# Patient Record
Sex: FEMALE | Race: WHITE | NOT HISPANIC OR LATINO | URBAN - METROPOLITAN AREA
[De-identification: names, ages, dates, MRNs, and addresses within clinical notes are randomized per-mention and may not be internally consistent; named-entity substitution may affect disease eponyms.]

---

## 2017-04-21 ASSESSMENT — ENCOUNTER SYMPTOMS
SMELL DISTURBANCE: 0
SLEEP DISTURBANCES DUE TO BREATHING: 0
DECREASED LIBIDO: 0
EXERCISE INTOLERANCE: 0
SINUS PAIN: 1
HYPOTENSION: 0
HYPERTENSION: 0
ORTHOPNEA: 0
TASTE DISTURBANCE: 0
SINUS CONGESTION: 1
CLAUDICATION: 0
LIGHT-HEADEDNESS: 0
TACHYCARDIA: 0
LEG SWELLING: 0
TROUBLE SWALLOWING: 0
LEG PAIN: 0
HOARSE VOICE: 0
PALPITATIONS: 0
NECK MASS: 0
HOT FLASHES: 0
SORE THROAT: 1
SYNCOPE: 0

## 2017-04-21 ASSESSMENT — ACTIVITIES OF DAILY LIVING (ADL)
DO_MEMBERS_OF_YOUR_HOUSEHOLD_USE_SAFETY_HELMETS?: Y
ARE_THERE_FIREARMS_IN_YOUR_HOME?: N
DO_MEMBERS_OF_YOUR_HOUSEHOLD_USE_SUNSCREEN?: Y
ARE_THERE_SMOKE_DETECTORS_IN_YOUR_HOME?: Y
ARE_THERE_CARBON_MONOXIDE_DETECTORS_IN_YOUR_HOME?: Y
DO_MEMBERS_OF_YOUR_HOUSEHOLD_WEAR_SEAT_BELTS?: Y

## 2017-04-24 ENCOUNTER — OFFICE VISIT (OUTPATIENT)
Dept: INTERNAL MEDICINE | Facility: CLINIC | Age: 42
End: 2017-04-24

## 2017-04-24 VITALS
TEMPERATURE: 97.8 F | WEIGHT: 111.5 LBS | HEIGHT: 62 IN | OXYGEN SATURATION: 100 % | SYSTOLIC BLOOD PRESSURE: 120 MMHG | HEART RATE: 71 BPM | BODY MASS INDEX: 20.52 KG/M2 | RESPIRATION RATE: 16 BRPM | DIASTOLIC BLOOD PRESSURE: 82 MMHG

## 2017-04-24 DIAGNOSIS — H61.20 WAX IN EAR: ICD-10-CM

## 2017-04-24 DIAGNOSIS — H69.91 DYSFUNCTION OF EUSTACHIAN TUBE, RIGHT: ICD-10-CM

## 2017-04-24 DIAGNOSIS — R53.82 CHRONIC FATIGUE: ICD-10-CM

## 2017-04-24 DIAGNOSIS — J30.2 SEASONAL ALLERGIC RHINITIS, UNSPECIFIED ALLERGIC RHINITIS TRIGGER: ICD-10-CM

## 2017-04-24 DIAGNOSIS — Z00.00 ROUTINE GENERAL MEDICAL EXAMINATION AT A HEALTH CARE FACILITY: ICD-10-CM

## 2017-04-24 DIAGNOSIS — Z12.31 VISIT FOR SCREENING MAMMOGRAM: ICD-10-CM

## 2017-04-24 DIAGNOSIS — Z85.828 HISTORY OF SKIN CANCER: Primary | ICD-10-CM

## 2017-04-24 LAB
CHOLEST SERPL-MCNC: 190 MG/DL
DEPRECATED CALCIDIOL+CALCIFEROL SERPL-MC: 33 UG/L (ref 20–75)
ERYTHROCYTE [DISTWIDTH] IN BLOOD BY AUTOMATED COUNT: 13.5 % (ref 10–15)
FERRITIN SERPL-MCNC: 42 NG/ML (ref 12–150)
HCT VFR BLD AUTO: 36.7 % (ref 35–47)
HDLC SERPL-MCNC: 86 MG/DL
HGB BLD-MCNC: 11.9 G/DL (ref 11.7–15.7)
LDLC SERPL CALC-MCNC: 78 MG/DL
MCH RBC QN AUTO: 30.2 PG (ref 26.5–33)
MCHC RBC AUTO-ENTMCNC: 32.4 G/DL (ref 31.5–36.5)
MCV RBC AUTO: 93 FL (ref 78–100)
NONHDLC SERPL-MCNC: 104 MG/DL
PLATELET # BLD AUTO: 260 10E9/L (ref 150–450)
RBC # BLD AUTO: 3.94 10E12/L (ref 3.8–5.2)
TRIGL SERPL-MCNC: 130 MG/DL
TSH SERPL DL<=0.05 MIU/L-ACNC: 1.11 MU/L (ref 0.4–4)
WBC # BLD AUTO: 6.3 10E9/L (ref 4–11)

## 2017-04-24 RX ORDER — LORATADINE 10 MG/1
10 TABLET ORAL DAILY
COMMUNITY
Start: 2015-07-22 | End: 2021-05-11

## 2017-04-24 ASSESSMENT — PAIN SCALES - GENERAL: PAINLEVEL: NO PAIN (0)

## 2017-04-24 NOTE — MR AVS SNAPSHOT
After Visit Summary   4/24/2017    Marli Cherry    MRN: [id number]           Patient Information     Date Of Birth          1975        Visit Information        Provider Department      4/24/2017 7:30 AM Adelaide Correa MD [facility]        Today's Diagnoses     History of skin cancer    -  1    Routine general medical examination at a health care facility        Visit for screening mammogram        Chronic fatigue        Dysfunction of eustachian tube, right        Seasonal allergic rhinitis, unspecified allergic rhinitis trigger        Wax in ear          Care Instructions    Primary Care Center Medication Refill Request Information:  * Please contact your pharmacy regarding ANY request for medication refills.  ** [facility] Prescription Fax = [phone]  * Please allow 3 business days for routine medication refills.  * Please allow 5 business days for controlled substance medication refills.     Primary Care Center Test Result notification information:  *You will be notified with in 7-10 days of your appointment day regarding the results of your test.  If you are on MyChart you will be notified as soon as the provider has reviewed the results and signed off on them.    Primary Care Center Phone Number [phone]    Dermatology [phone] (3rd Floor [facility])     Breast Center ([facility]) [phone] (2nd Floor [facility])   Breast Center ([facility]) [phone] ([address])     Mammogram Screening Tool    Mammogram   Does patient have a history of breast cancer? no  Does patient have breast implants? no  Reason for mammogram? Rountine        Try nasal spray, flonase or nasonex for ear plugging.  Can try debrox drops for ear wax, or carbamide peroxide.        Follow-ups after your visit        Additional Services     DERMATOLOGY REFERRAL       Your provider has referred you to: [facility]: Dermatology Clinic [facility] [phone]    [email or web address]    Please be aware that coverage of these services is subject to the terms and limitations of your health insurance plan.  Call member services at your health plan with any benefit or coverage questions.      Please bring the following with you to your appointment:    (1) Any X-Rays, CTs or MRIs which have been performed.  Contact the facility where they were done to arrange for  prior to your scheduled appointment.    (2) List of current medications  (3) This referral request   (4) Any documents/labs given to you for this referral                  Follow-up notes from your care team     Return in about 1 year (around 4/24/2018) for Physical Exam.      Your next 10 appointments already scheduled     Apr 24, 2017  8:45 AM CDT   LAB with  LAB   [facility] ([facility])    [address] [place] [address] [phone]           Patient must bring picture ID.  Patient should be prepared to give a urine specimen  Please do not eat 10-12 hours before your appointment if you are coming in fasting for labs on lipids, cholesterol, or glucose (sugar).  Pregnant women should follow their Care Team instructions. Water with medications is okay. Do not drink coffee or other fluids.   If you have concerns about taking  your medications, please ask at office or if scheduling via [employer], send a message by clicking on Secure Messaging, Message Your Care Team.            May 04, 2017  7:45 AM CDT   (Arrive by 7:30 AM)   MA SCREENING DIGITAL BILATERAL with [id number]   [facility] ([facility])    [address] [place] [address] [phone]           Do not use any powder, lotion or deodorant under your arms or on your breast. If you do, we will ask you to remove it before your exam.  Wear comfortable, two-piece clothing.  If you have any allergies,  tell your care team.  Bring any previous mammograms from other facilities or have them mailed to the breast center. This mammogram location, [facility], now offers 3D mammography. It doesn't replace a screening mammogram and can be done with a regular screening mammogram. It is optional and not all insurances will pay for it. 3D mammography is a special kind of mammogram that produces a three-dimensional image of the breast by using low dose-xrays. 3D allows the radiologist to see the breast tissue differently from 2D, which reduces the chance of repeat testing due to overlapping breast tissue. If you are interested in have a 3D mammogram, please check with your insurance before you arrive for your exam. 3D mammography is offered to all patients. On the day of your exam you will be asked to sign a form stating yes, you wish to have 3D imaging or, no, you decline.              Future tests that were ordered for you today     Open Future Orders        Priority Expected Expires Ordered    Mammogram, routine screening Routine  4/24/2018 4/24/2017    Lipid panel reflex to direct LDL Routine 4/24/2017 5/8/2017 4/24/2017    CBC with platelets Routine 4/24/2017 5/8/2017 4/24/2017    Ferritin Routine 4/24/2017 4/24/2018 4/24/2017    Vitamin D Deficiency Routine 4/24/2017 4/24/2018 4/24/2017    TSH Routine 4/24/2017 4/24/2018 4/24/2017            Who to contact     Please call your clinic at [phone] to:    Ask questions about your health    Make or cancel appointments    Discuss your medicines    Learn about your test results    Speak to your doctor   If you have compliments or concerns about an experience at your clinic, or if you wish to file a complaint, please contact [facility] Patient Relations at [phone] or email us at [email or web address]         Additional Information About Your Visit        [email or web address]     [employer] gives you secure  "access to your electronic health record. If you see a primary care provider, you can also send messages to your care team and make appointments. If you have questions, please call your primary care clinic.  If you do not have a primary care provider, please call [phone] and they will assist you.      [employer] is an electronic gateway that provides easy, online access to your medical records. With [employer], you can request a clinic appointment, read your test results, renew a prescription or communicate with your care team.     To access your existing account, please contact your [facility] or call [phone] for assistance.        Care EveryWhere ID     This is your Care EveryWhere ID. This could be used by other organizations to access your [place] medical records  [id number]        Your Vitals Were     Pulse Temperature Respirations Height Last Period Pulse Oximetry    71 97.8  F (36.6  C) (Oral) 16 1.575 m (5' 2.01\") 04/10/2017 (Exact Date) 100%    Breastfeeding? BMI (Body Mass Index)                No 20.39 kg/m2           Blood Pressure from Last 3 Encounters:   04/24/17 120/82    Weight from Last 3 Encounters:   04/24/17 50.6 kg (111 lb 8 oz)              We Performed the Following     DERMATOLOGY REFERRAL        Primary Care Provider Office Phone # Fax #    Samira Cuellar [phone]       [facility] [address] [facility] [address]        Thank you!     Thank you for choosing [facility]  for your care. Our goal is always to provide you with excellent care. Hearing back from our patients is one way we can continue to improve our services. Please take a few minutes to complete the written survey that you may receive in the mail after your visit with us. Thank you!             Your Updated Medication List - Protect others around you: Learn how to safely use, store and throw away your medicines at www.disposemymeds.org.    "       This list is accurate as of: 4/24/17  8:26 AM.  Always use your most recent med list.                   Brand Name Dispense Instructions for use    loratadine 10 MG tablet    CLARITIN     Take 10 mg by mouth

## 2017-04-24 NOTE — PROGRESS NOTES
Ms. Cherry is a 42 year old female here to establish care/routine physical.    History of Present Illness:  Marli is a generally healthy female here for annual physical.  No major concerns today, just a few minor concerns:    A few years ago spots removed by dermatologist.  Feels like they are coming back.  Wonders about seeing derm again.  Removed in [facility], possibly basal cell.  No records available.    Allergies, she does have R sided ear issues, associated with seasonal allergies.  Takes claritin which helps, occasionally pains.  Feels full, like water is in ear.      Feels like energy is low.  Sleep is poor, wakes up early.  Stress is manageable.  No fevers, weight loss, no night sweats.      Goes to gym 3 days per week  Walks to work  [place]   No children  Does administrative work      3 rounds IVF--unsuccessful  Last pap 9/15  No abnormal paps  Abnormal menses every 24-31 days  No abnormal mammos    A full 10-pt Review of Systems was performed, verified and is negative except as documented in the HPI.  All health questionnaires were reviewed, verified and relevant information documented above.    Past Medical History:  Past Medical History:   Diagnosis Date     Infertility, female      Skin cancer of chest, excluding breast        Past Surgical History:  Past Surgical History:   Procedure Laterality Date     LAPAROSCOPY         Active Meds:  Current Outpatient Prescriptions   Medication     loratadine (CLARITIN) 10 MG tablet     No current facility-administered medications for this visit.         Allergies:  Seasonal allergies    Family History:  family history includes Breast Cancer in her maternal grandmother; HEART DISEASE in her maternal grandmother; Hyperlipidemia in her father; Hypertension in her father.    Social History:  Social History   Substance Use Topics     Smoking status: Never Smoker     Smokeless tobacco: Not on file     Alcohol use 8.4 oz/week     14 Glasses  "of wine per week       Physical Exam:  Vitals: /82  Pulse 71  Temp 97.8  F (36.6  C) (Oral)  Resp 16  Ht 1.575 m (5' 2.01\")  Wt 50.6 kg (111 lb 8 oz)  LMP 04/10/2017 (Exact Date)  SpO2 100%  Breastfeeding? No  BMI 20.39 kg/m2  Constitutional: Alert, oriented, pleasant, thin, no acute distress  Head: Normocephalic, atraumatic  Eyes: Extra-ocular movements intact, pupils equally round and reactive bilaterally, no scleral icterus  ENT: Oropharynx clear, moist mucus membranes, good dentition  Neck: Supple, no lymphadenopathy, questionable thyromegaly vs soft tissue , no nodules palpate, no JVD  Cardiovascular: Regular rate and rhythm, no murmurs, rubs or gallops, peripheral pulses full/symmetric  Respiratory: Good air movement bilaterally, lungs clear, no wheezes/rales/rhonchi  Breast: Breasts: normal without suspicious masses, skin changes or axillary nodes  GI: Abdomen soft, bowel sounds present, nondistended, nontender, no organomegaly or masses, no rebound/guarding  Musculoskeletal: No edema, normal muscle tone, normal gait  Neurologic: Alert and oriented, cranial nerves 2-12 intact, nonfocal  Skin: multiple benign appearing nevi, slight erythema surrounding scar in area of previously incised nevus on L clavicular region, no drainage, not raised  Psychiatric: normal mentation, affect and mood    Recent Labs:  None on file    Assessment and Plan:  Marli was seen today for establish care and physical.    Diagnoses and all orders for this visit:    History of skin cancer: Numerous nevi today.  Full check not done but nothing alarming seen.  Some erythema on chest surrounding area of previously excised mole.  Needs further eval.  -     DERMATOLOGY REFERRAL    Routine general medical examination at a health care facility  -     Lipid panel reflex to direct LDL; Future    Visit for screening mammogram  -     Mammogram, routine screening; Future    Chronic fatigue: Possibly related to perimenopausal " changes.  -     CBC with platelets; Future  -     Ferritin; Future  -     Vitamin D Deficiency; Future  -     TSH; Future    Dysfunction of eustachian tube, right        - Recommended flonase    Seasonal allergic rhinitis, unspecified allergic rhinitis trigger        - Can try antihistamines    Wax in ear        - Recommended debrox drops      #Routine Health Maintenence:  Immunizations (zoster, pneumovax, flu, Tdap, Hep A/B):   Most Recent Immunizations   Administered Date(s) Administered     Influenza (IIV3) 09/14/2016     TDAP Vaccine (Boostrix) 11/23/2011     Lipids: ordered  Lung Ca Screening (>30 pk age 55-79 or >20 py age 50-79 + RF): n/a  Colonoscopy (50-75 yrs): n/a  Dexa (>65W or 70M yrs): n/a  Mammogram (40-75 yrs): ordered  Pap (21-65 yrs):  9/15 normal per patient  Pelvic/Breast: breast today  GC/Chlam (<25 yrs): n/a  HIV/HCV if risk factors: deferred  Safety/Lifestyle: reviewed  Tob/EtOH: screened  Depression: screen neg  Advanced Directive: deferred    Return to clinic:  1 year or prn    Adelaide Correa MD  Internal Medicine          Answers for HPI/ROS submitted by the patient on 4/21/2017   Annual Exam:  General Symptoms: No  Skin Symptoms: No  HENT Symptoms: Yes  EYE SYMPTOMS: No  HEART SYMPTOMS: Yes  LUNG SYMPTOMS: No  INTESTINAL SYMPTOMS: No  URINARY SYMPTOMS: No  GYNECOLOGIC SYMPTOMS: Yes  BREAST SYMPTOMS: No  SKELETAL SYMPTOMS: No  BLOOD SYMPTOMS: No  NERVOUS SYSTEM SYMPTOMS: No  MENTAL HEALTH SYMPTOMS: No  Ear pain: Yes  Ear discharge: No  Hearing loss: No  Tinnitus: No  Nosebleeds: Yes  Congestion: Yes  Sinus pain: Yes  Trouble swallowing: No   Voice hoarseness: No  Mouth sores: No  Sore throat: Yes  Tooth pain: No  Gum tenderness: No  Bleeding gums: No  Change in taste: No  Change in sense of smell: No  Dry mouth: No  Hearing aid used: No  Neck lump: No  Chest pain or pressure: No  Fast or irregular heartbeat: No  Pain in legs with walking: No  Swelling in feet or ankles: No  Trouble breathing  while lying down: No  Fingers or Toes appear blue: No  High blood pressure: No  Low blood pressure: No  Fainting: No  Murmurs: No  Chest pain on exertion: No  Chest pain at rest: No  Cramping pain in leg during exercise: No  Pacemaker: No  Varicose veins: Yes  Edema or swelling: No  Fast heart beat: No  Wake up at night with shortness of breath: No  Heart flutters: Yes  Light-headedness: No  Exercise intolerance: No  Bleeding or spotting between periods: Yes  Heavy or painful periods: Yes  Irregular periods: Yes  Vaginal discharge: No  Hot flashes: No  Vaginal dryness: No  Genital ulcers: No  Reduced libido: No  Painful intercourse: No  Difficulty with sexual arousal: No  Post-menopausal bleeding: No  Frequency of exercise:: 2-3 days/week  Duration of exercise:: 45-60 minutes

## 2017-04-24 NOTE — PATIENT INSTRUCTIONS
Primary Care Center Medication Refill Request Information:  * Please contact your pharmacy regarding ANY request for medication refills.  ** [facility] Prescription Fax = [phone]  * Please allow 3 business days for routine medication refills.  * Please allow 5 business days for controlled substance medication refills.     Primary Care Center Test Result notification information:  *You will be notified with in 7-10 days of your appointment day regarding the results of your test.  If you are on MyChart you will be notified as soon as the provider has reviewed the results and signed off on them.    Primary Care Center Phone Number [phone]    Dermatology [phone] (3rd Floor [facility])     Breast Center ([facility]) [phone] (2nd Floor [facility])   Breast Center ([facility]) [phone] ([address])     Mammogram Screening Tool    Mammogram   Does patient have a history of breast cancer? no  Does patient have breast implants? no  Reason for mammogram? Rountine        Try nasal spray, flonase or nasonex for ear plugging.  Can try debrox drops for ear wax, or carbamide peroxide.

## 2017-04-24 NOTE — NURSING NOTE
Chief Complaint   Patient presents with     Establish Care     Patient is here to establish a new PCP.      Physical     Patient is here for annual physical.      Candis Elizabeth LPN at 7:30 AM on 4/24/2017.

## 2017-05-30 ENCOUNTER — RADIANT APPOINTMENT (OUTPATIENT)
Dept: MAMMOGRAPHY | Facility: CLINIC | Age: 42
End: 2017-05-30

## 2017-05-30 ENCOUNTER — OFFICE VISIT (OUTPATIENT)
Dept: DERMATOLOGY | Facility: CLINIC | Age: 42
End: 2017-05-30

## 2017-05-30 DIAGNOSIS — Z12.31 VISIT FOR SCREENING MAMMOGRAM: ICD-10-CM

## 2017-05-30 DIAGNOSIS — D22.9 MULTIPLE BENIGN MELANOCYTIC NEVI: Primary | ICD-10-CM

## 2017-05-30 DIAGNOSIS — Z85.828 HISTORY OF NONMELANOMA SKIN CANCER: ICD-10-CM

## 2017-05-30 DIAGNOSIS — L82.1 SK (SEBORRHEIC KERATOSIS): ICD-10-CM

## 2017-05-30 ASSESSMENT — PAIN SCALES - GENERAL: PAINLEVEL: NO PAIN (0)

## 2017-05-30 NOTE — PROGRESS NOTES
[facility] Dermatology Note      Dermatology Problem List:  1. BCC, L upper chest, shave removal in Spring 2016  2. Multiple melanocytic nevi    Encounter Date: May 30, 2017    CC:   Chief Complaint   Patient presents with     Derm Problem     Marli is here today for a skin check.  States she has a spot on her chest she's concerned about where she had a bcc removed a year ago.       History of Present Illness:  Ms. Marli Cherry is a 42 year old female who presents to [facility] care. She has a history of a BCC of her left upper chest, removed with two shave procedures by Dr. Pena ([facility]), and presents reports concerns about pinkness and firmness at the site. She reports having numerous moles, but none new or changing. Denies painful, bleeding, nonhealing lesions on the skin.    Past Medical History:   There is no problem list on file for this patient.    Past Medical History:   Diagnosis Date     Infertility, female      Skin cancer of chest, excluding breast      Past Surgical History:   Procedure Laterality Date     BIOPSY OF SKIN LESION       LAPAROSCOPY       Social History:  The patient works in [employer]. She wears SPF 15 daily, and SPF30 when outdoors for extended time periods.    Family History:  Negative for melanoma, maternal aunt with breast ca, no pancreatic cancer.    Medications:  Current Outpatient Prescriptions   Medication Sig Dispense Refill     loratadine (CLARITIN) 10 MG tablet Take 10 mg by mouth          Allergies   Allergen Reactions     Seasonal Allergies      Review of Systems:  General: No recent infections, fevers, chills  Skin: No new/changing moles  Lymphatic: No subcutaneous lumps    Physical exam:  Vitals: There were no vitals taken for this visit.  GEN: Well-appearing female, no discomfort or distress, cooperative with the exam  SKIN: Total skin excluding the undergarment areas was performed. The exam included the head/face, neck, both  arms, chest, back, abdomen, both legs, digits and/or nails:   - lesion of concern, L upper chest: well-healed round pink-red scar, no friability or recurrence  - lesion of concern, L posterior thigh: waxy, light-brown 5 mm papule, no melanocytic features  - numerous well-marginated brown-to-black macules and occasional flesh-toned papules, most with even, reticular dermoscopic features  -No other lesions of concern on areas examined.     Impression/Plan:  1. History of BCC, L upper chest, well healed with mildly hypertrophic scarring and no evidence of recurrence.  2. Multiple melanocytic nevi, none with concerning features.  3. Seborrheic keratosis, L posterior thigh  - Discussed etiology, natural history, and reassured of the benign nature of these lesions.  - No further treatment required.  - Counseled on photoprotection, reviewed the ABCDE's of skin cancer monitoring.   - advised light massage daily to the L upper chest scar     Follow-up: 1 year.    Discussed and examined with [facility] staff dermatologist Dr. Marcos Magallanes.    Aleksander Cheng MD, EDEN  PGY-4, Dermatology    Staff Involved:  Resident(Aleksander Cheng)/Staff(as above)    Staff Physician Comments:   I saw and evaluated the patient with the resident and I agree with the assessment and plan.  I was present for the examination.    Marcos Magallanes MD  Dermatology Staff Physician  , Department of Dermatology

## 2017-05-30 NOTE — MR AVS SNAPSHOT
After Visit Summary   5/30/2017    Marli Cherry    MRN: [id number]           Patient Information     Date Of Birth          1975        Visit Information        Provider Department      5/30/2017 3:15 PM Marcos Magallanes MD [facility] Dermatology        Today's Diagnoses     Multiple benign melanocytic nevi    -  1    History of nonmelanoma skin cancer        SK (seborrheic keratosis)           Follow-ups after your visit        Who to contact     Please call your clinic at [phone] to:    Ask questions about your health    Make or cancel appointments    Discuss your medicines    Learn about your test results    Speak to your doctor   If you have compliments or concerns about an experience at your clinic, or if you wish to file a complaint, please contact [facility] Patient Relations at [phone] or email us at [email or web address]         Additional Information About Your Visit        MyChart Information     [employer] gives you secure access to your electronic health record. If you see a primary care provider, you can also send messages to your care team and make appointments. If you have questions, please call your primary care clinic.  If you do not have a primary care provider, please call [phone] and they will assist you.      [employer] is an electronic gateway that provides easy, online access to your medical records. With [employer], you can request a clinic appointment, read your test results, renew a prescription or communicate with your care team.     To access your existing account, please contact your [facility] or call [phone] for assistance.        Care EveryWhere ID     This is your Care EveryWhere ID. This could be used by other organizations to access your [place] medical records  [id number]         Blood Pressure from Last 3 Encounters:   04/24/17 120/82    Weight from Last 3 Encounters:    04/24/17 50.6 kg (111 lb 8 oz)              Today, you had the following     No orders found for display       Primary Care Provider Office Phone # Fax #    Samira Cuellar [phone]       [facility] [address] [facility] [address]        Thank you!     Thank you for choosing [facility]  for your care. Our goal is always to provide you with excellent care. Hearing back from our patients is one way we can continue to improve our services. Please take a few minutes to complete the written survey that you may receive in the mail after your visit with us. Thank you!             Your Updated Medication List - Protect others around you: Learn how to safely use, store and throw away your medicines at www.disposemymeds.org.          This list is accurate as of: 5/30/17 11:59 PM.  Always use your most recent med list.                   Brand Name Dispense Instructions for use    loratadine 10 MG tablet    CLARITIN     Take 10 mg by mouth

## 2017-05-30 NOTE — NURSING NOTE
Dermatology Rooming Note    Marli EULALIA Cherry's goals for this visit include:   Chief Complaint   Patient presents with     Derm Problem     Marli is here today for a skin check.  States she has a spot on her chest she's concerned about where she had a bcc removed a year ago.         Maci Camacho, BETTYN

## 2017-05-30 NOTE — LETTER
5/30/2017       RE: Marli Cherry  [address]   [facility] [address]     Dear Colleague,    Thank you for referring your patient, Marli Cherry, to the [facility] DERMATOLOGY at [facility]. Please see a copy of my visit note below.    [facility] Dermatology Note      Dermatology Problem List:  1. BCC, L upper chest, shave removal in Spring 2016  2. Multiple melanocytic nevi    Encounter Date: May 30, 2017    CC:   Chief Complaint   Patient presents with     Derm Problem     Marli is here today for a skin check.  States she has a spot on her chest she's concerned about where she had a bcc removed a year ago.       History of Present Illness:  Ms. Marli Cherry is a 42 year old female who presents to transition care. She has a history of a BCC of her left upper chest, removed with two shave procedures by Dr. Pena ([facility]), and presents reports concerns about pinkness and firmness at the site. She reports having numerous moles, but none new or changing. Denies painful, bleeding, nonhealing lesions on the skin.    Past Medical History:   There is no problem list on file for this patient.    Past Medical History:   Diagnosis Date     Infertility, female      Skin cancer of chest, excluding breast      Past Surgical History:   Procedure Laterality Date     BIOPSY OF SKIN LESION       LAPAROSCOPY       Social History:  The patient works in administration. She wears SPF 15 daily, and SPF30 when outdoors for extended time periods.    Family History:  Negative for melanoma, maternal aunt with breast ca, no pancreatic cancer.    Medications:  Current Outpatient Prescriptions   Medication Sig Dispense Refill     loratadine (CLARITIN) 10 MG tablet Take 10 mg by mouth          Allergies   Allergen Reactions     Seasonal Allergies      Review of Systems:  General: No recent infections, fevers, chills  Skin: No new/changing moles  Lymphatic: No  subcutaneous lumps    Physical exam:  Vitals: There were no vitals taken for this visit.  GEN: Well-appearing female, no discomfort or distress, cooperative with the exam  SKIN: Total skin excluding the undergarment areas was performed. The exam included the head/face, neck, both arms, chest, back, abdomen, both legs, digits and/or nails:   - lesion of concern, L upper chest: well-healed round pink-red scar, no friability or recurrence  - lesion of concern, L posterior thigh: waxy, light-brown 5 mm papule, no melanocytic features  - numerous well-marginated brown-to-black macules and occasional flesh-toned papules, most with even, reticular dermoscopic features  -No other lesions of concern on areas examined.     Impression/Plan:  1. History of BCC, L upper chest, well healed with mildly hypertrophic scarring and no evidence of recurrence.  2. Multiple melanocytic nevi, none with concerning features.  3. Seborrheic keratosis, L posterior thigh  - Discussed etiology, natural history, and reassured of the benign nature of these lesions.  - No further treatment required.  - Counseled on photoprotection, reviewed the ABCDE's of skin cancer monitoring.   - advised light massage daily to the L upper chest scar     Follow-up: 1 year.    Discussed and examined with [facility] staff dermatologist Dr. Marcos Magallanes.    Aleksander Cheng MD, EDEN  PGY-4, Dermatology    Staff Involved:  Resident(Aleksander Cheng)/Staff(as above)    Staff Physician Comments:   I saw and evaluated the patient with the resident and I agree with the assessment and plan.  I was present for the examination.    Marcos Magallanes MD  Dermatology Staff Physician  , Department of Dermatology      Again, thank you for allowing me to participate in the care of your patient.      Sincerely,    Marcos Magallanes MD

## 2018-01-07 ENCOUNTER — HEALTH MAINTENANCE LETTER (OUTPATIENT)
Age: 43
End: 2018-01-07

## 2018-04-18 ASSESSMENT — ENCOUNTER SYMPTOMS
LEG PAIN: 0
FEVER: 0
SLEEP DISTURBANCES DUE TO BREATHING: 0
FATIGUE: 1
JAUNDICE: 0
POLYDIPSIA: 1
VOMITING: 0
INCREASED ENERGY: 1
HOT FLASHES: 0
ORTHOPNEA: 0
BOWEL INCONTINENCE: 0
DECREASED LIBIDO: 0
HALLUCINATIONS: 0
RECTAL PAIN: 0
HYPOTENSION: 0
BLOATING: 1
DECREASED APPETITE: 1
SYNCOPE: 0
LIGHT-HEADEDNESS: 1
NIGHT SWEATS: 0
HYPERTENSION: 0
CHILLS: 0
POLYPHAGIA: 0
ABDOMINAL PAIN: 1
PALPITATIONS: 0
DIARRHEA: 1
NAUSEA: 0
HEARTBURN: 1
WEIGHT LOSS: 0
WEIGHT GAIN: 1
ALTERED TEMPERATURE REGULATION: 0
BLOOD IN STOOL: 0
EXERCISE INTOLERANCE: 0
CONSTIPATION: 0

## 2018-04-30 ENCOUNTER — OFFICE VISIT (OUTPATIENT)
Dept: INTERNAL MEDICINE | Facility: CLINIC | Age: 43
End: 2018-04-30
Payer: COMMERCIAL

## 2018-04-30 VITALS
DIASTOLIC BLOOD PRESSURE: 85 MMHG | BODY MASS INDEX: 20.8 KG/M2 | HEART RATE: 66 BPM | SYSTOLIC BLOOD PRESSURE: 136 MMHG | WEIGHT: 113 LBS | HEIGHT: 62 IN

## 2018-04-30 DIAGNOSIS — Z11.4 SCREENING FOR HIV WITHOUT PRESENCE OF RISK FACTORS: ICD-10-CM

## 2018-04-30 DIAGNOSIS — Z13.220 SCREENING FOR HYPERLIPIDEMIA: ICD-10-CM

## 2018-04-30 DIAGNOSIS — Z12.31 ENCOUNTER FOR SCREENING MAMMOGRAM FOR BREAST CANCER: Primary | ICD-10-CM

## 2018-04-30 DIAGNOSIS — E04.9 GOITER: ICD-10-CM

## 2018-04-30 DIAGNOSIS — Z00.00 ROUTINE GENERAL MEDICAL EXAMINATION AT A HEALTH CARE FACILITY: ICD-10-CM

## 2018-04-30 LAB
ALBUMIN SERPL-MCNC: 4 G/DL (ref 3.4–5)
ALP SERPL-CCNC: 61 U/L (ref 40–150)
ALT SERPL W P-5'-P-CCNC: 17 U/L (ref 0–50)
AST SERPL W P-5'-P-CCNC: 14 U/L (ref 0–45)
BILIRUB DIRECT SERPL-MCNC: <0.1 MG/DL (ref 0–0.2)
BILIRUB SERPL-MCNC: 0.4 MG/DL (ref 0.2–1.3)
CHOLEST SERPL-MCNC: 185 MG/DL
CREAT SERPL-MCNC: 0.73 MG/DL (ref 0.52–1.04)
GFR SERPL CREATININE-BSD FRML MDRD: 86 ML/MIN/1.7M2
HDLC SERPL-MCNC: 66 MG/DL
HGB BLD-MCNC: 12.7 G/DL (ref 11.7–15.7)
LDLC SERPL CALC-MCNC: 90 MG/DL
NONHDLC SERPL-MCNC: 118 MG/DL
PROT SERPL-MCNC: 7.4 G/DL (ref 6.8–8.8)
TRIGL SERPL-MCNC: 143 MG/DL
TSH SERPL DL<=0.005 MIU/L-ACNC: 1.4 MU/L (ref 0.4–4)

## 2018-04-30 ASSESSMENT — PAIN SCALES - GENERAL: PAINLEVEL: NO PAIN (0)

## 2018-04-30 NOTE — PROGRESS NOTES
[facility]  Primary Care Center   Adelaide Correa MD  2018      Chief Complaint:   Physical       History of Present Illness:   Marli Cherry is a 43 year old female with a history of infertility and skin cancer of the chest who presents for annual physical. Today, the patient is feeling good. The patient's only concern today was having irritable bowels. She said over the past few months she gets symptoms of constipation, diarrhea and bloating. These symptoms occur every two weeks. She notes no new life stressors. The patient does not want to start taking any medications for this issue at this time.     The patient also brought up a recent ovarian cyst that she had. She stated that she went to [facility] to see the gynecologist there after having some discomfort in that area. Since, the cyst has corrected itself and the gynecologist had no further recommendations. She does note that her recent cycle was shorter than normal, 24 days instead of 28 days. No other acute health concerns.      3 rounds IVF--unsuccessful  Last pap 9/15,  NIL ([facility])  No abnormal paps  Abnormal menses every 24-31 days  No abnormal mammos       We reviewed the following health maintenance topics:  Immunizations  Cancer Screenings (Pap, mammogram, colonoscopy, prostate, skin, lung)  Lipids, STDs, HIV/Hep C screening if applicable  AAA screening, Dexa screening if applicable  Lifestyle factors/Saftey (diet, exercise, weight, stress, seat belts, sun protection)  Risk behaviors (tobacco, alcohol, illicit substances, abuse/violence, depression)  Advanced directive planning if applicable       Review of Systems:   Pertinent items are noted in HPI, remainder of complete ROS is negative.    Answers for HPI/ROS submitted by the patient on 2018   General Symptoms: Yes  Skin Symptoms: No  HENT Symptoms: No  EYE SYMPTOMS: No  HEART SYMPTOMS: Yes  LUNG SYMPTOMS: No  INTESTINAL SYMPTOMS: Yes  URINARY  SYMPTOMS: No  GYNECOLOGIC SYMPTOMS: Yes  BREAST SYMPTOMS: No  SKELETAL SYMPTOMS: No  BLOOD SYMPTOMS: No  NERVOUS SYSTEM SYMPTOMS: No  MENTAL HEALTH SYMPTOMS: No  Fever: No  Loss of appetite: Yes  Weight loss: No  Weight gain: Yes  Fatigue: Yes  Night sweats: No  Chills: No  Increased stress: No  Excessive hunger: No  Excessive thirst: Yes  Feeling hot or cold when others believe the temperature is normal: No  Loss of height: No  Post-operative complications: No  Surgical site pain: No  Hallucinations: No  Change in or Loss of Energy: Yes  Hyperactivity: No  Confusion: No  Chest pain or pressure: No  Fast or irregular heartbeat: No  Pain in legs with walking: No  Trouble breathing while lying down: No  Fingers or toes appear blue: No  High blood pressure: No  Low blood pressure: No  Fainting: No  Murmurs: No  Pacemaker: No  Varicose veins: Yes  Edema or swelling: No  Wake up at night with shortness of breath: No  Light-headedness: Yes  Exercise intolerance: No  Heart burn or indigestion: Yes  Nausea: No  Vomiting: No  Abdominal pain: Yes  Bloating: Yes  Constipation: No  Diarrhea: Yes  Blood in stool: No  Black stools: No  Rectal or Anal pain: No  Fecal incontinence: No  Yellowing of skin or eyes: No  Vomit with blood: No  Change in stools: No  Bleeding or spotting between periods: No  Heavy or painful periods: No  Irregular periods: Yes  Vaginal discharge: No  Hot flashes: No  Vaginal dryness: No  Genital ulcers: No  Reduced libido: No  Painful intercourse: No  Difficulty with sexual arousal: No  Post-menopausal bleeding: No      Active Medications:      loratadine (CLARITIN) 10 MG tablet, Take 10 mg by mouth, Disp: , Rfl:       Allergies:   Seasonal allergies      Past Medical History:  Infertility  Skin cancer of chest, excluding breast      Past Surgical History:  Biopsy of skin lesion  Laparoscopy    Family History:   Hypertension - father  Hyperlipidemia - father  Heart disease - maternal grandmother  Breast  "cancer - maternal grandmother      Social History:   Tobacco Use: none  Alcohol Use: 14 glasses of wine per week  PCP: Samira Cuellar        Physical Exam:   /85  Pulse 66  Ht 1.575 m (5' 2\")  Wt 51.3 kg (113 lb)  LMP 04/26/2018  Breastfeeding? No  BMI 20.67 kg/m2   Constitutional: Alert, oriented, pleasant, no acute distress  Head: Normocephalic, atraumatic  Eyes: Extra-ocular movements intact, no scleral icterus  ENT: Oropharynx clear, moist mucus membranes, good dentition  Neck: Supple, no lymphadenopathy, prominent thyroid without nodules  Cardiovascular: Regular rate and rhythm, no murmurs, rubs or gallops, peripheral pulses full/symmetric  Respiratory: Good air movement bilaterally, lungs clear, no wheezes/rales/rhonchi  GI: Abdomen soft, bowel sounds present, nondistended, nontender, no organomegaly or masses, no rebound/guarding  Musculoskeletal: No edema, normal muscle tone, normal gait  Neurologic: Alert and oriented, cranial nerves 2-12 intact.  Skin: No rashes/lesions, several benign appearing nevi  Psychiatric: normal mentation, affect and mood  Breast/Pelvic deferred because recently done with Gyn    Routine Health Maintenance  PHQ-2 Score:     No flowsheet data found.    Immunizations (zoster, pneumovax, flu, Tdap, Hep A/B):   Most Recent Immunizations   Administered Date(s) Administered     Influenza (IIV3) PF 09/14/2016     TDAP Vaccine (Boostrix) 11/23/2011     Lipids:   Recent Labs   Lab Test  04/24/17   0853   CHOL  190   HDL  86   LDL  78   TRIG  130     Mammogram (40-75 yrs): 5/17 normal, scheduled for end of May  Pap (21-65 yrs): performed two months ago, normal results   Pelvic/Breast: done with Gyn  HIV/HCV if risk factors: recommended  Safety/Lifestyle: Not exercising regularly due to recent ovarian cyst        Assessment and Plan:  Routine general medical examination at a health care facility  We reviewed the patient's last lab work. She agreed to getting the following lab " work done again today and is fasting.   - TSH  - Hepatic panel  - Creatinine  - Hemoglobin    Encounter for screening mammogram for breast cancer  The patient is scheduled for a mammogram in May.   - Mammogram, routine screening    Screening for hyperlipidemia  The patient is fasting today and agreed to a lipid panel reflex.   - Lipid panel reflex to direct LDL Fasting     Screening for HIV  -HIV          Follow-up: Return in about 1 year (around 4/30/2019) for Physical Exam.         Scribe Disclosure:   I, Kasie Villatoro, am serving as a scribe to document services personally performed by Adelaide Correa MD at this visit, based upon the provider's statements to me. All documentation has been reviewed by the aforementioned provider prior to being entered into the official medical record.     Portions of this medical record were completed by a scribe. UPON MY REVIEW AND AUTHENTICATION BY ELECTRONIC SIGNATURE, this confirms (a) I performed the applicable clinical services, and (b) the record is accurate.   Adelaide Correa MD  Internal Medicine

## 2018-04-30 NOTE — MR AVS SNAPSHOT
After Visit Summary   4/30/2018    Marli Cherry    MRN: [id number]           Patient Information     Date Of Birth          1975        Visit Information        Provider Department      4/30/2018 7:30 AM Adelaide Correa MD [facility]        Today's Diagnoses     Encounter for screening mammogram for breast cancer    -  1    Routine general medical examination at a health care facility        Screening for hyperlipidemia           Follow-ups after your visit        Follow-up notes from your care team     Return in about 1 year (around 4/30/2019) for Physical Exam.      Your next 10 appointments already scheduled     Apr 30, 2018  8:15 AM CDT   LAB with  LAB   [facility] Lab ([facility])    [address] [place] [address] [id number] [phone]           Please do not eat 10-12 hours before your appointment if you are coming in fasting for labs on lipids, cholesterol, or glucose (sugar). This does not apply to pregnant women. Water, hot tea and black coffee (with nothing added) are okay. Do not drink other fluids, diet soda or chew gum.            May 30, 2018  8:45 AM CDT   (Arrive by 8:30 AM)   Screening Mammogram with [id number]   [facility] ([facility])    [address] [place] [address] [id number] [phone]           Do NOT use body powder, lotions, perfume or deodorant the day of the exam.  If your last mammogram was not done at [place], please bring your mammogram films. We will need the name of your provider to send a copy of your report.  A mammogram may be covered on an annual or biannual basis, please check with your insurance company.            May 30, 2018  9:30 AM CDT   (Arrive by 9:15 AM)   Return Visit with Ciara Díaz MD   [facility] D[facility] ([facility])    [address] [place] [address]  [id number] [phone]              Future tests that were ordered for you today     Open Future Orders        Priority Expected Expires Ordered    Lipid panel reflex to direct LDL Fasting Routine 4/30/2018 5/14/2018 4/30/2018    Mammogram, routine screening Routine  4/30/2019 4/30/2018    TSH Routine 4/30/2018 4/30/2019 4/30/2018    Hepatic panel Routine 4/30/2018 5/14/2018 4/30/2018    Creatinine Routine 4/30/2018 4/30/2019 4/30/2018    Hemoglobin Routine 4/30/2018 4/30/2019 4/30/2018            Who to contact     Please call your clinic at [phone] to:    Ask questions about your health    Make or cancel appointments    Discuss your medicines    Learn about your test results    Speak to your doctor            Additional Information About Your Visit        [employer] Information     [employer] gives you secure access to your electronic health record. If you see a primary care provider, you can also send messages to your care team and make appointments. If you have questions, please call your primary care clinic.  If you do not have a primary care provider, please call [phone] and they will assist you.      [employer] is an electronic gateway that provides easy, online access to your medical records. With [employer], you can request a clinic appointment, read your test results, renew a prescription or communicate with your care team.     To access your existing account, please contact your [facility] or call [phone] for assistance.        Care EveryWhere ID     This is your Care EveryWhere ID. This could be used by other organizations to access your [place] medical records  [id number]        Your Vitals Were     Pulse Height Last Period Breastfeeding? BMI (Body Mass Index)       66 1.575 m (5' 2\") 04/26/2018 No 20.67 kg/m2        Blood Pressure from Last 3 Encounters:   04/30/18 136/85   04/24/17 120/82    Weight from Last 3 Encounters:   04/30/18 51.3 kg (113 lb)   04/24/17 " 50.6 kg (111 lb 8 oz)               Primary Care Provider Office Phone # Fax #    Samira Cuellar [phone]       [facility] [address] [facility] [address]        Equal Access to Services     AARON PABON : Hadii aad ku hadsaudo Soomaali, waaxda luqadaha, qaybta kaalmada adeegyada, nila jefe ronniericky toribio hemanthnadine ballard. So [facility] [phone].    ATENCIÓN: Si habla español, tiene a magallon disposición servicios gratuitos de asistencia lingüística. Ll[facility] [phone].    We comply with applicable federal civil rights laws and [place] laws. We do not discriminate on the basis of race, color, national origin, age, disability, sex, sexual orientation, or gender identity.            Thank you!     Thank you for choosing [facility]  for your care. Our goal is always to provide you with excellent care. Hearing back from our patients is one way we can continue to improve our services. Please take a few minutes to complete the written survey that you may receive in the mail after your visit with us. Thank you!             Your Updated Medication List - Protect others around you: Learn how to safely use, store and throw away your medicines at www.disposemymeds.org.          This list is accurate as of 4/30/18  7:59 AM.  Always use your most recent med list.                   Brand Name Dispense Instructions for use Diagnosis    loratadine 10 MG tablet    CLARITIN     Take 10 mg by mouth

## 2018-05-30 ENCOUNTER — OFFICE VISIT (OUTPATIENT)
Dept: DERMATOLOGY | Facility: CLINIC | Age: 43
End: 2018-05-30
Payer: COMMERCIAL

## 2018-05-30 DIAGNOSIS — Z12.31 ENCOUNTER FOR SCREENING MAMMOGRAM FOR BREAST CANCER: ICD-10-CM

## 2018-05-30 DIAGNOSIS — D48.5 NEOPLASM OF UNCERTAIN BEHAVIOR OF SKIN: Primary | ICD-10-CM

## 2018-05-30 RX ORDER — LIDOCAINE HYDROCHLORIDE AND EPINEPHRINE 10; 10 MG/ML; UG/ML
3 INJECTION, SOLUTION INFILTRATION; PERINEURAL ONCE
Qty: 3 ML | Refills: 0 | OUTPATIENT
Start: 2018-05-30 | End: 2018-05-30

## 2018-05-30 ASSESSMENT — PAIN SCALES - GENERAL
PAINLEVEL: NO PAIN (0)
PAINLEVEL: NO PAIN (0)

## 2018-05-30 NOTE — MR AVS SNAPSHOT
After Visit Summary   5/30/2018    Marli Cherry    MRN: [id number]           Patient Information     Date Of Birth          1975        Visit Information        Provider Department      5/30/2018 9:30 AM Ciara Díaz MD [facility] Dermatology        Today's Diagnoses     Neoplasm of uncertain behavior of skin    -  1      Care Instructions    Wound Care After a Biopsy    What is a skin biopsy?  A skin biopsy allows the doctor to examine a very small piece of tissue under the microscope to determine the diagnosis and the best treatment for the skin condition. A local anesthetic (numbing medicine)  is injected with a very small needle into the skin area to be tested. A small piece of skin is taken from the area. Sometimes a suture (stitch) is used.     What are the risks of a skin biopsy?  I will experience scar, bleeding, swelling, pain, crusting and redness. I may experience incomplete removal or recurrence. Risks of this procedure are excessive bleeding, bruising, infection, nerve damage, numbness, thick (hypertrophic or keloidal) scar and non-diagnostic biopsy.    How should I care for my wound for the first 24 hours?    Keep the wound dry and covered for 24 hours    If it bleeds, hold direct pressure on the area for 15 minutes. If bleeding does not stop then go to the emergency room    Avoid strenuous exercise the first 1-2 days or as your doctor instructs you    How should I care for the wound after 24 hours?    After 24 hours, remove the bandage    You may bathe or shower as normal    If you had a scalp biopsy, you can shampoo as usual and can use shower water to clean the biopsy site daily    Clean the wound twice a day with gentle soap and water    Do not scrub, be gentle    Apply white petroleum/Vaseline after cleaning the wound with a cotton swab or a clean finger, and keep the site covered with a Bandaid /bandage. Bandages are not necessary with a scalp biopsy    If you are  unable to cover the site with a Bandaid /bandage, re-apply ointment 2-3 times a day to keep the site moist. Moisture will help with healing    Avoid strenuous activity for first 1-2 days    Avoid lakes, rivers, pools, and oceans until the stitches are removed or the site is healed    How do I clean my wound?    Wash hands thoroughly with soap or use hand  before all wound care    Clean the wound with gentle soap and water    Apply white petroleum/Vaseline  to wound after it is clean    Replace the Bandaid /bandage to keep the wound covered for the first few days or as instructed by your doctor    If you had a scalp biopsy, warm shower water to the area on a daily basis should suffice    What should I use to clean my wound?     Cotton-tipped applicators (Qtips )    White petroleum jelly (Vaseline ). Use a clean new container and use Q-tips to apply.    Bandaids   as needed    Gentle soap     How should I care for my wound long term?    Do not get your wound dirty    Keep up with wound care for one week or until the area is healed.    A small scab will form and fall off by itself when the area is completely healed. The area will be red and will become pink in color as it heals. Sun protection is very important for how your scar will turn out. Sunscreen with an SPF 30 or greater is recommended once the area is healed.    If you have stitches, stitches need to be removed in 14 days. You may return to our clinic for this or you may have it done locally at your doctor s office.    You should have some soreness but it should be mild and slowly go away over several days. Talk to your doctor about using tylenol for pain,    When should I call my doctor?  If you have increased:     Pain or swelling    Pus or drainage (clear or slightly yellow drainage is ok)    Temperature over 100F    Spreading redness or warmth around wound    When will I hear about my results?  The biopsy results can take 2-3 weeks to come back.  The clinic will call you with the results, send you a [employer] message, or have you schedule a follow-up clinic or phone time to discuss the results. Contact our clinics if you do not hear from us in 3 weeks.     Who should I call with questions?    [facility]: [phone]     [facility]: [phone]    For urgent needs outside of business hours call the [facility] at [phone] and ask for the dermatology resident on call              Follow-ups after your visit        Who to contact     Please call your clinic at [phone] to:    Ask questions about your health    Make or cancel appointments    Discuss your medicines    Learn about your test results    Speak to your doctor            Additional Information About Your Visit        [employer] Information     [employer] gives you secure access to your electronic health record. If you see a primary care provider, you can also send messages to your care team and make appointments. If you have questions, please call your primary care clinic.  If you do not have a primary care provider, please call [phone] and they will assist you.      [employer] is an electronic gateway that provides easy, online access to your medical records. With [employer], you can request a clinic appointment, read your test results, renew a prescription or communicate with your care team.     To access your existing account, please contact your [facility] or call [phone] for assistance.        Care EveryWhere ID     This is your Care EveryWhere ID. This could be used by other organizations to access your [place] medical records  [id number]         Blood Pressure from Last 3 Encounters:   04/30/18 136/85   04/24/17 120/82    Weight from Last 3 Encounters:   04/30/18 51.3 kg (113 lb)   04/24/17 50.6 kg (111 lb 8 oz)              We Performed the Following     BIOPSY SKIN/SUBQ/MUC MEM, SINGLE  LESION     Dermatological path order and indications          Today's Medication Changes          These changes are accurate as of 5/30/18 10:02 AM.  If you have any questions, ask your nurse or doctor.               Start taking these medicines.        Dose/Directions    lidocaine 1% with EPINEPHrine 1:100,000 1 %-1:225504 injection   Used for:  Neoplasm of uncertain behavior of skin   Started by:  Ciara Díaz MD        Dose:  3 mL   Inject 3 mLs into the skin once for 1 dose   Quantity:  3 mL   Refills:  0            Where to get your medicines      Some of these will need a paper prescription and others can be bought over the counter.  Ask your nurse if you have questions.     You don't need a prescription for these medications     lidocaine 1% with EPINEPHrine 1:100,000 1 %-1:357726 injection                Primary Care Provider Office Phone # Fax #    Samira Cuellar [phone]       [facility] [address] [facility] [address]        Equal Access to Services     HERMELINDO PABON : Hadii no garciao Sorosalia, waaxda luqadaha, qaybta kaalmada adeegyada, nila bonilla . So [facility] [phone].    ATENCIÓN: Si habla español, tiene a magallon disposición servicios gratuitos de asistencia lingüística. Llame al [phone].    We comply with applicable federal civil rights laws and [place] laws. We do not discriminate on the basis of race, color, national origin, age, disability, sex, sexual orientation, or gender identity.            Thank you!     Thank you for choosing [facility] DERMATOLOGY  for your care. Our goal is always to provide you with excellent care. Hearing back from our patients is one way we can continue to improve our services. Please take a few minutes to complete the written survey that you may receive in the mail after your visit with us. Thank you!             Your Updated Medication List - Protect others around you: Learn how to  safely use, store and throw away your medicines at www.disposemymeds.org.          This list is accurate as of 5/30/18 10:02 AM.  Always use your most recent med list.                   Brand Name Dispense Instructions for use Diagnosis    lidocaine 1% with EPINEPHrine 1:100,000 1 %-1:843270 injection     3 mL    Inject 3 mLs into the skin once for 1 dose    Neoplasm of uncertain behavior of skin       loratadine 10 MG tablet    CLARITIN     Take 10 mg by mouth

## 2018-05-30 NOTE — LETTER
5/30/2018       RE: Marli Cherry  [address]  [place] [address]     Dear Colleague,    Thank you for referring your patient, Marli Cherry, to the [facility] DERMATOLOGY at [facility]. Please see a copy of my visit note below.    [facility] Dermatology Note      Dermatology Problem List:  1. BCC, L upper chest, shave removal in Spring 2016  2. Multiple melanocytic nevi  3. NUB left upper chest at edge of previous BCC scar, biopsy 5/30/2018    Encounter Date: May 30, 2018    CC:   Chief Complaint   Patient presents with     Derm Problem     Marli is here for an annual skin check . Her only area of concern is a spot on her chect that has benen biposyied prior but is still red.      History of Present Illness:  Ms. Marli Cherry is a 43 year old female with a medical history of BCC and multiple melanocytic nevi who presented to the clinic for a whole-body Derm follow-up.  Patient has a history of basal cell carcinoma of the left upper chest status post shave procedure by Dr. Pena in 2016.  Today she reports a mild skin irritation/erythema around the site of the biopsy.  She denies any discharge, itching, or significant discomfort from the site of the previous biopsy.  She just a little concerned due to her history of basal cell carcinoma and would like area evaluated.  Patient also complained of a right shin lesion  there appeared several weeks ago. Rash is flat and appears similar to her upper chest BCC when it initially appeared. She denies any discomfort from shin lesion. She has no other complaints. She has a history of whole body melanocytic nevi and denies any significant changes in body rash.  She denies tobacco use and tanning hernandez use.  Patient uses sunscreens depending on how long she will be outside.  She is doing well otherwise and has no other complaints today. Patient denies fever, chills, headaches, blurry vision, chest pain,  shortness of breath, abdominal pain, nausea, vomiting, diarrhea, constipation, and lower extremity edema.     Past Medical History:   There is no problem list on file for this patient.    Past Medical History:   Diagnosis Date     Infertility, female      Skin cancer of chest, excluding breast      Past Surgical History:   Procedure Laterality Date     BIOPSY OF SKIN LESION       LAPAROSCOPY       Social History:  The patient works in [employer]. She wears SPF 15 daily, and SPF30 when outdoors for extended time periods.    Family History:  Negative for melanoma, maternal aunt with breast ca, no pancreatic cancer.    Medications:  Current Outpatient Prescriptions   Medication Sig Dispense Refill     loratadine (CLARITIN) 10 MG tablet Take 10 mg by mouth          Allergies   Allergen Reactions     Seasonal Allergies      Review of Systems: 10 point review of system negative except as mentioned in HPI.     Physical exam:  Vitals: There were no vitals taken for this visit.  GEN: Well-appearing female, no discomfort or distress, cooperative with the exam  SKIN: Total skin including the undergarment areas was performed. The exam included the head/face, neck, both arms, chest, back, buttocks, abdomen, both legs, digits and/or nails:   - lesion of concern, L upper chest: well-healed round pink-red scar, no friability or recurrence  - lesion of concern, L posterior thigh: waxy, light-brown 5 mm papule, no melanocytic features  - numerous well-marginated brown-to-black macules and occasional flesh-toned papules, most with even, reticular dermoscopic features  - Flat light pink macule on the right shin. Lesion is  non-tender or none friable   - Raised macular round hard lesion on the right inner thigh with positive dimple sign suggestive of dermatofibroma   -No other lesions of concern on areas examined.       Impression/Plan:  1. History of BCC, L upper chest, well healed with mildly hypertrophic scarring: Discussed  repeating biopsy due to scaring and physical exam finding.     - Shave biopsy:  After discussion of benefits and risks including but not limited to bleeding/bruising, pain/swelling, infection, scar, incomplete removal, nerve damage/numbness, recurrence, and non-diagnostic biopsy, written consent, verbal consent and photographs were obtained. Time-out was performed. The area was cleaned with isopropyl alcohol.  was injected to obtain adequate anesthesia of the lesion on the left upper chest. 2 ml of 1% lidocaine was injected to obtain adequate anesthesia. A  shave biopsy was performed. Hemostasis was achieved with aluminium chloride. Vaseline and a sterile dressing were applied. The patient tolerated the procedure and no complications were noted. The patient was provided with verbal and written post care instructions.      2. Right shin lesion: Physical exam revealed non specific Flat light pink macule lesion on the right shin. Lesion is  not-tender or friable. Watchful observation recommended.  Counseled on photoprotection, reviewed the ABCDE's of skin cancer monitoring.       3. Multiple melanocytic nevi, none with concerning features.    Follow-up: 6 months     Discussed and examined with [facility] staff dermatologist Dr. Arjun Leone MD  PGY3 [facility] Family Medicine Resident   Pager: [phone]    .I, Ciara Díaz MD, saw this patient with the resident and agree with the resident s findings and plan of care as documented in the resident s note.  I was present for key portions of the procedure. KB        Pictures were placed in Pt's chart today for future reference.      Again, thank you for allowing me to participate in the care of your patient.      Sincerely,    Ciara Díaz MD

## 2018-05-30 NOTE — PROGRESS NOTES
[facility] Dermatology Note      Dermatology Problem List:  1. BCC, L upper chest, shave removal in Spring 2016  2. Multiple melanocytic nevi  3. NUB left upper chest at edge of previous BCC scar, biopsy 5/30/2018    Encounter Date: May 30, 2018    CC:   Chief Complaint   Patient presents with     Derm Problem     Marli is here for an annual skin check . Her only area of concern is a spot on her chect that has benen biposyied prior but is still red.      History of Present Illness:  Ms. Marli Cherry is a 43 year old female with a medical history of BCC and multiple melanocytic nevi who presented to the clinic for a whole-body Derm follow-up.  Patient has a history of basal cell carcinoma of the left upper chest status post shave procedure by Dr. Pena in 2016.  Today she reports a mild skin irritation/erythema around the site of the biopsy.  She denies any discharge, itching, or significant discomfort from the site of the previous biopsy.  She just a little concerned due to her history of basal cell carcinoma and would like area evaluated.  Patient also complained of a right shin lesion  there appeared several weeks ago. Rash is flat and appears similar to her upper chest BCC when it initially appeared. She denies any discomfort from shin lesion. She has no other complaints. She has a history of whole body melanocytic nevi and denies any significant changes in body rash.  She denies tobacco use and tanning hernandez use.  Patient uses sunscreens depending on how long she will be outside.  She is doing well otherwise and has no other complaints today. Patient denies fever, chills, headaches, blurry vision, chest pain, shortness of breath, abdominal pain, nausea, vomiting, diarrhea, constipation, and lower extremity edema.     Past Medical History:   There is no problem list on file for this patient.    Past Medical History:   Diagnosis Date     Infertility, female      Skin cancer of chest,  excluding breast      Past Surgical History:   Procedure Laterality Date     BIOPSY OF SKIN LESION       LAPAROSCOPY       Social History:  The patient works in [employer]. She wears SPF 15 daily, and SPF30 when outdoors for extended time periods.    Family History:  Negative for melanoma, maternal aunt with breast ca, no pancreatic cancer.    Medications:  Current Outpatient Prescriptions   Medication Sig Dispense Refill     loratadine (CLARITIN) 10 MG tablet Take 10 mg by mouth          Allergies   Allergen Reactions     Seasonal Allergies      Review of Systems: 10 point review of system negative except as mentioned in HPI.     Physical exam:  Vitals: There were no vitals taken for this visit.  GEN: Well-appearing female, no discomfort or distress, cooperative with the exam  SKIN: Total skin including the undergarment areas was performed. The exam included the head/face, neck, both arms, chest, back, buttocks, abdomen, both legs, digits and/or nails:   - lesion of concern, L upper chest: well-healed round pink-red scar, no friability or recurrence  - lesion of concern, L posterior thigh: waxy, light-brown 5 mm papule, no melanocytic features  - numerous well-marginated brown-to-black macules and occasional flesh-toned papules, most with even, reticular dermoscopic features  - Flat light pink macule on the right shin. Lesion is  non-tender or none friable   - Raised macular round hard lesion on the right inner thigh with positive dimple sign suggestive of dermatofibroma   -No other lesions of concern on areas examined.       Impression/Plan:  1. History of BCC, L upper chest, well healed with mildly hypertrophic scarring: Discussed repeating biopsy due to scaring and physical exam finding.     - Shave biopsy:  After discussion of benefits and risks including but not limited to bleeding/bruising, pain/swelling, infection, scar, incomplete removal, nerve damage/numbness, recurrence, and non-diagnostic biopsy,  written consent, verbal consent and photographs were obtained. Time-out was performed. The area was cleaned with isopropyl alcohol.  was injected to obtain adequate anesthesia of the lesion on the left upper chest. 2 ml of 1% lidocaine was injected to obtain adequate anesthesia. A  shave biopsy was performed. Hemostasis was achieved with aluminium chloride. Vaseline and a sterile dressing were applied. The patient tolerated the procedure and no complications were noted. The patient was provided with verbal and written post care instructions.      2. Right shin lesion: Physical exam revealed non specific Flat light pink macule lesion on the right shin. Lesion is  not-tender or friable. Watchful observation recommended.  Counseled on photoprotection, reviewed the ABCDE's of skin cancer monitoring.       3. Multiple melanocytic nevi, none with concerning features.    Follow-up: 6 months     Discussed and examined with [facility] staff dermatologist Dr. Arjun Leone MD  PGY3 [facility] Resident   Pager: [phone]    .I, Ciara Díaz MD, saw this patient with the resident and agree with the resident s findings and plan of care as documented in the resident s note.  I was present for key portions of the procedure. KB

## 2018-05-30 NOTE — NURSING NOTE
Dermatology Rooming Note    Marli EULALIA Cherry's goals for this visit include:   Chief Complaint   Patient presents with     Derm Problem     Marli is here for an annual skin check . Her only area of concern is a spot on her chect that has benen biposyied prior but is still red.      Sujey Livingston LPN

## 2018-05-30 NOTE — PATIENT INSTRUCTIONS

## 2018-06-04 DIAGNOSIS — C44.91 BCC (BASAL CELL CARCINOMA): Primary | ICD-10-CM

## 2018-06-04 LAB — COPATH REPORT: NORMAL

## 2018-07-16 ENCOUNTER — OFFICE VISIT (OUTPATIENT)
Dept: DERMATOLOGY | Facility: CLINIC | Age: 43
End: 2018-07-16
Payer: COMMERCIAL

## 2018-07-16 VITALS — HEART RATE: 73 BPM | SYSTOLIC BLOOD PRESSURE: 124 MMHG | DIASTOLIC BLOOD PRESSURE: 85 MMHG | RESPIRATION RATE: 16 BRPM

## 2018-07-16 DIAGNOSIS — C44.519 BASAL CELL CARCINOMA OF ANTERIOR CHEST: Primary | ICD-10-CM

## 2018-07-16 ASSESSMENT — PAIN SCALES - GENERAL: PAINLEVEL: NO PAIN (0)

## 2018-07-16 NOTE — PATIENT INSTRUCTIONS
[facility] Unit  Lenard Valentine DO      Pre-Surgical Instruction Sheet    1. Expect to be here majority of the morning.  The Mohs surgical procedure can be an extensive surgery requiring multiple stages. Each stage may take 1-2 hours.    ? You may eat breakfast  ? You may bring snacks or beverages with you  ? Take all normal medications morning of surgery -- unless otherwise indicated by your physician  ? If you have an artificial heart valve, or joint replacement within two years, we will prescribe an antibiotic. Please take one hour prior to surgery.    2. You will need a  to be with you if your surgical site is close to your eyes. You can get swelling/bruising immediately after surgery and will go home with a big bulky bandage that could obstruct your view of driving safely.    3. Wear comfortable clothing -- preferably a button down shirt or a loose fitted shirt. (to avoid pulling over pressure bandage off when you get home) If your surgery site is on your leg, try wearing loose fitted pants. If your surgery site is on your arm, try wearing a short-sleeve shirt.    4. Bring reading material or other items to help pass time. We do have internet access available if you own a laptop, iPad, etc.)    5. Women - do NOT wear make-up. We will be washing it off anyone needing surgery on the face    6. Do NOT stop blood thinning medication unless instructed to do so by your surgeon. This will include: Warfarin, Coumadin, Jantoven, Aspirin, Plavix and Pradaxa. If you are taking Warfarin or Coumadin, please have your INR blood test results sent to our office no more than 7 days prior to your surgery.     7. Tylenol is a good alternative to take for headaches and pain, and can be taken throughout your surgery and postoperative recovery.    8. If your surgery is on your trunk, arms, hands, legs, feet, fingernail or a toenail, please wash the area with Hibiclens, an over-the-counter antiseptic  soap, the night before and morning of your surgery.     If you have any questions, please feel free to contact us.    Phone numbers:  During business hours (M-F 8:00-4:30 p.m.)  [facility]:  [phone] - select option 1 for appt. desk  [phone] - select option 3 for nurse triage line  [facility]:   [phone] - goes straight to call center   ---------------------------------------------------------  Evenings/Weekends/Holidays  Hospital - [phone]   TTY for hearing utdyxhkz-[phone]  *Ask  to page dermatologist on-call  Emergency [employer]-[phone]  TTY for hearing impaired- [phone]

## 2018-07-16 NOTE — NURSING NOTE
Dermatology Rooming Note    Marli Cherry's goals for this visit include:   Chief Complaint   Patient presents with     Consult     Marli is here today for a consult on her left chest.      Kim Graves MA

## 2018-07-16 NOTE — MR AVS SNAPSHOT
After Visit Summary   7/16/2018    Marli Cherry    MRN: [id number]           Patient Information     Date Of Birth          1975        Visit Information        Provider Department      7/16/2018 3:15 PM Lenard Valentine MD [facility] Dermatologic Surgery        Care Instructions    Mohs Cutaneous Micrographic Surgery Unit  Lenard Valentine DO      Pre-Surgical Instruction Sheet    1. Expect to be here majority of the morning.  The Mohs surgical procedure can be an extensive surgery requiring multiple stages. Each stage may take 1-2 hours.    ? You may eat breakfast  ? You may bring snacks or beverages with you  ? Take all normal medications morning of surgery -- unless otherwise indicated by your physician  ? If you have an artificial heart valve, or joint replacement within two years, we will prescribe an antibiotic. Please take one hour prior to surgery.    2. You will need a  to be with you if your surgical site is close to your eyes. You can get swelling/bruising immediately after surgery and will go home with a big bulky bandage that could obstruct your view of driving safely.    3. Wear comfortable clothing -- preferably a button down shirt or a loose fitted shirt. (to avoid pulling over pressure bandage off when you get home) If your surgery site is on your leg, try wearing loose fitted pants. If your surgery site is on your arm, try wearing a short-sleeve shirt.    4. Bring reading material or other items to help pass time. We do have internet access available if you own a laptop, iPad, etc.)    5. Women - do NOT wear make-up. We will be washing it off anyone needing surgery on the face    6. Do NOT stop blood thinning medication unless instructed to do so by your surgeon. This will include: Warfarin, Coumadin, Jantoven, Aspirin, Plavix and Pradaxa. If you are taking Warfarin or Coumadin, please have your INR blood test results sent to our office no more than 7 days prior to your  surgery.     7. Tylenol is a good alternative to take for headaches and pain, and can be taken throughout your surgery and postoperative recovery.    8. If your surgery is on your trunk, arms, hands, legs, feet, fingernail or a toenail, please wash the area with Hibiclens, an over-the-counter antiseptic soap, the night before and morning of your surgery.     If you have any questions, please feel free to contact us.    Phone numbers:  During business hours (M-F 8:00-4:30 p.m.)  [facility]:  [phone] - select option 1 for appt. desk  [phone] - select option 3 for nurse triage line  [facility]:   [phone] - goes straight to call center   ---------------------------------------------------------  Evenings/Weekends/Holidays  Hospital - [phone]   TTY for hearing [id number]-[phone]  *Ask  to page dermatologist on-call  Emergency Rvux-[phone]  TTY for hearing impaired- [phone]                Follow-ups after your visit        Your next 10 appointments already scheduled     Jul 17, 2018  7:30 AM CDT   (Arrive by 7:15 AM)   New Mohs with Lenard Valentine MD   [facility] Dermatologic Surgery ([facility])    [address] [place] [id number] [phone]              Who to contact     Please call your clinic at [phone] to:    Ask questions about your health    Make or cancel appointments    Discuss your medicines    Learn about your test results    Speak to your doctor            Additional Information About Your Visit        MyChart Information     [employer] gives you secure access to your electronic health record. If you see a primary care provider, you can also send messages to your care team and make appointments. If you have questions, please call your primary care clinic.  If you do not have a primary care provider, please call [phone] and they will assist  you.      [employer] is an electronic gateway that provides easy, online access to your medical records. With [employer], you can request a clinic appointment, read your test results, renew a prescription or communicate with your care team.     To access your existing account, please contact your [facility] or call [phone] for assistance.        Care EveryWhere ID     This is your Care EveryWhere ID. This could be used by other organizations to access your [employer] medical records  [id number]        Your Vitals Were     Pulse Respirations                73 16           Blood Pressure from Last 3 Encounters:   07/16/18 124/85   04/30/18 136/85   04/24/17 120/82    Weight from Last 3 Encounters:   04/30/18 51.3 kg (113 lb)   04/24/17 50.6 kg (111 lb 8 oz)              Today, you had the following     No orders found for display       Primary Care Provider Office Phone # Fax #    Samira Cuellar [phone]       [facility] [address] [facility] [address]        Equal Access to Services     AARON PABON : Hadii aad ku hadasho Soomaali, waaxda luqadaha, qaybta kaalmada adeegyada, waxay idiin hayaristeon malu bonilla . So [facility] [phone].    ATENCIÓN: Si habla español, tiene a magallno disposición servicios gratuitos de asistencia lingüística. [facility] [phone].    We comply with applicable federal civil rights laws and [place] laws. We do not discriminate on the basis of race, color, national origin, age, disability, sex, sexual orientation, or gender identity.            Thank you!     Thank you for choosing [facility] DERMATOLOGIC SURGERY  for your care. Our goal is always to provide you with excellent care. Hearing back from our patients is one way we can continue to improve our services. Please take a few minutes to complete the written survey that you may receive in the mail after your visit with us. Thank you!             Your Updated Medication  List - Protect others around you: Learn how to safely use, store and throw away your medicines at www.disposemymeds.org.          This list is accurate as of 7/16/18  4:46 PM.  Always use your most recent med list.                   Brand Name Dispense Instructions for use Diagnosis    loratadine 10 MG tablet    CLARITIN     Take 10 mg by mouth

## 2018-07-16 NOTE — PROGRESS NOTES
[facility] Dermatology Note      Dermatology Problem List:  1. History of NMSC:  - Recurrent BCC, L upper chest, s/p Mohs   - BCC, L upper chest, shave removal in Spring 2016  2. Multiple melanocytic nevi    Encounter Date: Jul 16, 2018    CC:  Chief Complaint   Patient presents with     Consult     Marli is here today for a consult on her left chest.          History of Present Illness:  Ms. Marli Cherry is a 43 year old female who presents today for a Mohs consultation regarding a basal cell carcinoma, superficial type, arising in association with scar. She has had this spot worked on in the past - she said in 2016 she had the lesion removed by shave, she does not recall burning. She always thought that a little bit of red coloration still remained after the initial removal. Despite bringing it up at previous annual exams, it was re-biopsied about 6 weeks ago She says that it looks much improved compared to back at the time it was biopsied at the point it was biopsied. She has heard of Mohs, but has never had it herself. She would like to know more about the procedure, its risks, and the chance of a third recurrence. She also wonder if we should simply delay the procedure given that she can no longer see it, or if there are other options for treatment altogether.     The patient is otherwise feeling well. There are no other skin concerns at this time.      Past Medical History:   There is no problem list on file for this patient.    Past Medical History:   Diagnosis Date     Infertility, female      Skin cancer of chest, excluding breast      Past Surgical History:   Procedure Laterality Date     BIOPSY OF SKIN LESION       LAPAROSCOPY         Social History:  Not reviewed at this visit.     Family History:  Not reviewed at this visit.     Medications:  Current Outpatient Prescriptions   Medication Sig Dispense Refill     loratadine (CLARITIN) 10 MG tablet Take 10 mg by mouth    "      Allergies   Allergen Reactions     Seasonal Allergies        Review of Systems:  -Skin Establ Pt: The patient denies any new rash, pruritus, or lesions that are symptomatic, changing or bleeding, except as per HPI.  -Constitutional: The patient is feeling generally well.    Physical exam:  Vitals: /85 (Cuff Size: Adult Regular)  Pulse 73  Resp 16  GEN: This is a well developed, well-nourished female in no acute distress, in a pleasant mood.    SKIN: Focused examination of the left chest was performed.  - On the left chest, there is a 1.3 x 1.0 pink depressed scaly macule  - No other lesions of concern on areas examined.       Impression/Plan:  1. Basal cell carcinoma, superficial type, likely recurrence     Discussed nature of basal cell carcinoma with patient as well as treatment options, including Mohs, topicals, excision, ED&C, and observation. After discussion of these options, patient seems most comfortable with Mohs, but was also considering observation.     Risks, benefits, and process of Mohs micrographic surgery were discussed including possibility of damage to surrounding anatomical structures and infection. Patient is comfortable proceeding with the surgery; consent form was obtained. She will be scheduled at her convenience.    Discussed linear scar that will result from surgery.     No indications for pre-op antibiotics    Pre-op written instructions provided    Ultimately patient decided to cancel her scheduled Mohs appointment in favor of observation. Superficial BCC is indolent and low risk.     Prior path report not available. Could request, if there is a more aggressive subtype or deep margin involved, treatment may be indicated.     **Update report received. Superficial subtype BCC in report from 2/18/16; \"Left Clavicle.\"     With a path report stating it appears excised, I do not feel observation is a high risk choice as long as she is presenting for serial skin exams. Dr. Díaz " had wanted to see her back in 6 months. If there appears to be clinical recurrence, can reschedule Mohs.     Staff Involved:    Scribe Disclosure  I, Diego Clark, am serving as a scribe to document services personally performed by Dr. Lenard Valentine DO, based on data collection and the provider's statements to me.     Provider Disclosure:   The documentation recorded by the scribe accurately reflects the services I personally performed and the decisions made by me.    Lenard Valentine DO    Department of Dermatology  [facility]: Phone: [phone], Fax:[phone]  [facility]: Phone: [phone], Fax: [phone]

## 2018-07-16 NOTE — LETTER
7/16/2018       RE: Marli Cherry  [address]  [place] [address]     Dear Colleague,    Thank you for referring your patient, Marli Cherry, to the [facility] DERMATOLOGIC SURGERY at [facility]. Please see a copy of my visit note below.    [facility] Dermatology Note      Dermatology Problem List:  1.  History of NMSC:  - Recurrent BCC, L upper chest, s/p Mohs   - BCC, L upper chest, shave removal in Spring 2016  2. Multiple melanocytic nevi    Encounter Date: Jul 16, 2018    CC:  Chief Complaint   Patient presents with     Consult     Marli is here today for a consult on her left chest.          History of Present Illness:  Ms. Marli Cherry is a 43 year old female who presents today for a Mohs consultation regarding a basal cell carcinoma, superficial type, arising in association with scar. She has had this spot worked on in the past - she said in 2016 she had the lesion removed by shave, she does not recall burning. She always thought that a little bit of red coloration still remained after the initial removal. Despite bringing it up at previous annual exams, it was re-biopsied about 6 weeks ago She says that it looks much improved compared to back at the time it was biopsied at the point it was biopsied. She has heard of Mohs, but has never had it herself. She would like to know more about the procedure, its risks, and the chance of a third recurrence. She also wonder if we should simply delay the procedure given that she can no longer see it, or if there are other options for treatment altogether.     The patient is otherwise feeling well. There are no other skin concerns at this time.      Past Medical History:   There is no problem list on file for this patient.    Past Medical History:   Diagnosis Date     Infertility, female      Skin cancer of chest, excluding breast      Past Surgical History:   Procedure Laterality Date     BIOPSY OF  SKIN LESION       LAPAROSCOPY         Social History:  Not reviewed at this visit.     Family History:  Not reviewed at this visit.     Medications:  Current Outpatient Prescriptions   Medication Sig Dispense Refill     loratadine (CLARITIN) 10 MG tablet Take 10 mg by mouth         Allergies   Allergen Reactions     Seasonal Allergies        Review of Systems:  -Skin Establ Pt: The patient denies any new rash, pruritus, or lesions that are symptomatic, changing or bleeding, except as per HPI.  -Constitutional: The patient is feeling generally well.    Physical exam:  Vitals: /85 (Cuff Size: Adult Regular)  Pulse 73  Resp 16  GEN: This is a well developed, well-nourished female in no acute distress, in a pleasant mood.    SKIN: Focused examination of the left chest was performed.  - On the left chest, there is a 1.3 x 1.0 pink depressed scaly macule  - No other lesions of concern on areas examined.       Impression/Plan:  1. Basal cell carcinoma, superficial type, likely recurrence     Discussed nature of basal cell carcinoma with patient as well as treatment options, including Mohs, topicals, excision, ED&C, and observation. After discussion of these options, patient seems most comfortable with Mohs, but was also considering observation.     Risks, benefits, and process of Mohs micrographic surgery were discussed including possibility of damage to surrounding anatomical structures and infection. Patient is comfortable proceeding with the surgery; consent form was obtained. She will be scheduled at her convenience.    Discussed linear scar that will result from surgery.     No indications for pre-op antibiotics    Pre-op written instructions provided    Ultimately patient decided to cancel her scheduled Mohs appointment in favor of observation. Superficial BCC is indolent and low risk.     Prior path report not available. Could request, if there is a more aggressive subtype or deep margin involved, treatment  "may be indicated.     **Update report received. Superficial subtype BCC in report from 2/18/16; \"Left Clavicle.\"     With a path report stating it appears excised, I do not feel observation is a high risk choice as long as she is presenting for serial skin exams. Dr. Díaz had wanted to see her back in 6 months. If there appears to be clinical recurrence, can reschedule Mohs.     Staff Involved:    Scribe Disclosure  I, Diego Clark, am serving as a scribe to document services personally performed by Dr. Lenard Valentine DO, based on data collection and the provider's statements to me.     Provider Disclosure:   The documentation recorded by the scribe accurately reflects the services I personally performed and the decisions made by me.    Lenard Valentine DO    Department of Dermatology  [facility]: Phone: [phone], Fax:[phone]  [facility]: Phone: [phone], Fax: [phone]    Again, thank you for allowing me to participate in the care of your patient.      Sincerely,    Lenard Valentine MD      "

## 2018-07-16 NOTE — Clinical Note
Xavier Diana,  This patient decided to take a watchful waiting approach. I don't think that is very risky. We're going to try to request her old path report to see if it has a compelling reason to treat. Thank you.  Lenard

## 2018-08-09 ENCOUNTER — TELEPHONE (OUTPATIENT)
Dept: DERMATOLOGY | Facility: CLINIC | Age: 43
End: 2018-08-09

## 2018-08-09 NOTE — TELEPHONE ENCOUNTER
"Nurse called patient and informed her per Dr. Valentine \"With a path report stating it appears excised, I do not feel observation is a high risk choice as long as she is presenting for serial skin exams. Dr. Díaz had wanted to see her back in 6 months. If there appears to be clinical recurrence, can reschedule Mohs.\"    Patient confirmed understanding verbally. Nurse scheduled appointment with Dr. Díaz on Nov 12th at 8:45 am for total body skin exam. Patient thanked nurse for call.  Lore Najera LPN    "

## 2019-02-11 ENCOUNTER — OFFICE VISIT (OUTPATIENT)
Dept: DERMATOLOGY | Facility: CLINIC | Age: 44
End: 2019-02-11
Payer: COMMERCIAL

## 2019-02-11 DIAGNOSIS — L90.5 SCAR: ICD-10-CM

## 2019-02-11 DIAGNOSIS — Z85.828 HISTORY OF BASAL CELL CARCINOMA: Primary | ICD-10-CM

## 2019-02-11 ASSESSMENT — PAIN SCALES - GENERAL: PAINLEVEL: NO PAIN (0)

## 2019-02-11 NOTE — PROGRESS NOTES
[facility] Dermatology Note    Dermatology Problem List:  1. History of NMSC:  - Recurrent BCC, L upper chest, s/p Mohs   - BCC, L upper chest, shave removal in Spring 2016  2. Multiple melanocytic nevi  3. **Lesions to monitor   - L upper chest, BCC w/ recurrence. NERD 02/11/19    Encounter Date: Feb 11, 2019    CC:  Chief Complaint   Patient presents with     Derm Problem     Marli is here to have a biopsy site on her chest rechecked, states it's healed well.         History of Present Illness:  Ms. Marli Cherry is a 43 year old female who presents today for a follow up evaluation of a recurrent BCC on her L chest. She last saw Dr. Valentine in July where they decided to watch and wait how the scar did since the re-biopsy showed that the BCC was entirely removed. She hasn't noticed any change since then. We offered a full body skin exam today but she would like to come back in May or June when she normally gets her skin checks. She is otherwise feeling well and has no skin complaints.     The patient is otherwise feeling well. There are no other skin concerns at this time.    Past Medical History:   There is no problem list on file for this patient.    Past Medical History:   Diagnosis Date     Infertility, female      Skin cancer of chest, excluding breast      Past Surgical History:   Procedure Laterality Date     BIOPSY OF SKIN LESION       LAPAROSCOPY       Social History:  Not reviewed at this visit.     Family History:  Not reviewed at this visit.     Medications:  Current Outpatient Medications   Medication Sig Dispense Refill     loratadine (CLARITIN) 10 MG tablet Take 10 mg by mouth       Allergies   Allergen Reactions     Seasonal Allergies      Review of Systems:  -Skin Establ Pt: The patient denies any new rash, pruritus, or lesions that are symptomatic, changing or bleeding, except as per HPI.  -Constitutional: The patient is feeling generally well.    Physical exam:  Vitals: There  were no vitals taken for this visit.  GEN: This is a well developed, well-nourished female in no acute distress, in a pleasant mood.    SKIN: Focused examination of the left chest was performed.  - On the left chest, there is a 1.3 x 1.0 well healed scar with no nodularity or concerning pigment changes   - No other lesions of concern on areas examined.     Impression/Plan:  1. Scar at site of prior BCC with recurrence   Patient has a history of a BCC on her L chest which was initially removed via shave biopsy in the Spring of 2016. A repeat biopsy in the Spring of 2018 showed another basal cell carcinoma which was removed in the biopsy. She had a consult with Dr. Valentine 07/2018 and discussed the possible role of surgery but at time time decided to watch the area carefully. Today there is no evidence of recurrence within the scar.    - Return to clinic in May/June 2019 for routine skin check or sooner prn     Staff Involved:  Patient was seen and discussed with attending physician Dr. Díaz.     Verona Batres MD/MPH  Internal Medicine/Dermatology  PGY-2  [phone]  .I, Ciara Díaz MD, saw this patient with the resident and agree with the resident s findings and plan of care as documented in the resident s note.

## 2019-02-11 NOTE — LETTER
2/11/2019       RE: Marli Cherry  [address]  [place] [address]     Dear Colleague,    Thank you for referring your patient, Marli Cherry, to the [facility] DERMATOLOGY at [facility]. Please see a copy of my visit note below.    [facility] Dermatology Note    Dermatology Problem List:  1. History of NMSC:  - Recurrent BCC, L upper chest, s/p Mohs   - BCC, L upper chest, shave removal in Spring 2016  2. Multiple melanocytic nevi  3. **Lesions to monitor   - L upper chest, BCC w/ recurrence. NERD 02/11/19    Encounter Date: Feb 11, 2019    CC:  Chief Complaint   Patient presents with     Derm Problem     Marli is here to have a biopsy site on her chest rechecked, states it's healed well.         History of Present Illness:  Ms. Marli Cherry is a 43 year old female who presents today for a follow up evaluation of a recurrent BCC on her L chest. She last saw Dr. Valentine in July where they decided to watch and wait how the scar did since the re-biopsy showed that the BCC was entirely removed. She hasn't noticed any change since then. We offered a full body skin exam today but she would like to come back in May or June when she normally gets her skin checks. She is otherwise feeling well and has no skin complaints.     The patient is otherwise feeling well. There are no other skin concerns at this time.    Past Medical History:   There is no problem list on file for this patient.    Past Medical History:   Diagnosis Date     Infertility, female      Skin cancer of chest, excluding breast      Past Surgical History:   Procedure Laterality Date     BIOPSY OF SKIN LESION       LAPAROSCOPY       Social History:  Not reviewed at this visit.     Family History:  Not reviewed at this visit.     Medications:  Current Outpatient Medications   Medication Sig Dispense Refill     loratadine (CLARITIN) 10 MG tablet Take 10 mg by mouth       Allergies   Allergen  Reactions     Seasonal Allergies      Review of Systems:  -Skin Establ Pt: The patient denies any new rash, pruritus, or lesions that are symptomatic, changing or bleeding, except as per HPI.  -Constitutional: The patient is feeling generally well.    Physical exam:  Vitals: There were no vitals taken for this visit.  GEN: This is a well developed, well-nourished female in no acute distress, in a pleasant mood.    SKIN: Focused examination of the left chest was performed.  - On the left chest, there is a 1.3 x 1.0 well healed scar with no nodularity or concerning pigment changes   - No other lesions of concern on areas examined.     Impression/Plan:  1. Scar at site of prior BCC with recurrence   Patient has a history of a BCC on her L chest which was initially removed via shave biopsy in the Spring of 2016. A repeat biopsy in the Spring of 2018 showed another basal cell carcinoma which was removed in the biopsy. She had a consult with Dr. Valentine 07/2018 and discussed the possible role of surgery but at time time decided to watch the area carefully. Today there is no evidence of recurrence within the scar.    - Return to clinic in May/June 2019 for routine skin check or sooner prn     Staff Involved:  Patient was seen and discussed with attending physician Dr. Díaz.     Verona Batres MD/MPH  Internal Medicine/Dermatology  PGY-2  [phone]  .I, Ciara Díaz MD, saw this patient with the resident and agree with the resident s findings and plan of care as documented in the resident s note.    Again, thank you for allowing me to participate in the care of your patient.      Sincerely,    Ciara Díaz MD

## 2019-02-11 NOTE — NURSING NOTE
Dermatology Rooming Note    Marli Cherry's goals for this visit include:   Chief Complaint   Patient presents with     Derm Problem     Marli is here to have a biopsy site on her chest rechecked, states it's healed well.         Maci Camacho LPN

## 2019-02-19 ENCOUNTER — MYC MEDICAL ADVICE (OUTPATIENT)
Dept: INTERNAL MEDICINE | Facility: CLINIC | Age: 44
End: 2019-02-19

## 2019-05-29 ENCOUNTER — OFFICE VISIT (OUTPATIENT)
Dept: DERMATOLOGY | Facility: CLINIC | Age: 44
End: 2019-05-29
Payer: COMMERCIAL

## 2019-05-29 DIAGNOSIS — L57.0 AK (ACTINIC KERATOSIS): Primary | ICD-10-CM

## 2019-05-29 DIAGNOSIS — D22.9 MULTIPLE NEVI: ICD-10-CM

## 2019-05-29 DIAGNOSIS — Z85.828 HISTORY OF SKIN CANCER: ICD-10-CM

## 2019-05-29 ASSESSMENT — PAIN SCALES - GENERAL
PAINLEVEL: NO PAIN (1)
PAINLEVEL: NO PAIN (0)

## 2019-05-29 NOTE — PATIENT INSTRUCTIONS
Cryotherapy    What is it?    Use of a very cold liquid, such as liquid nitrogen, to freeze and destroy abnormal skin cells that need to be removed    What should I expect?    Tenderness and redness    A small blister that might grow and fill with dark purple blood. There may be crusting.    More than one treatment may be needed if the lesions do not go away.    How do I care for the treated area?    Gently wash the area with your hands when bathing.    Use a thin layer of Vaseline to help with healing. You may use a Band-Aid.     The area should heal within 7-10 days and may leave behind a pink or lighter color.     Do not use an antibiotic or Neosporin ointment.     You may take acetaminophen (Tylenol) for pain.     Call your Doctor if you have:    Severe pain    Signs of infection (warmth, redness, cloudy yellow drainage, and or a bad smell)    Questions or concerns    Who should I call with questions?       [facility]: [phone]       [facility]: [phone]       For urgent needs outside of business hours call the [facility] at [phone]        and ask for the dermatology resident on call

## 2019-05-29 NOTE — NURSING NOTE
Dermatology Rooming Note    Marli Cherry's goals for this visit include:   Chief Complaint   Patient presents with     Derm Problem     Marli is here for a skin check, states no concerns.       Maci Camacho LPN

## 2019-05-29 NOTE — PROGRESS NOTES
[facility] Dermatology Note      Dermatology Problem List:    1. History of NMSC:  - Recurrent BCC, L upper chest, s/p Mohs   - BCC, L upper chest, shave removal in Spring 2016  2. Multiple melanocytic nevi  3. **Lesions to monitor   - L upper chest, BCC w/ recurrence. NERD 02/11/19      CC:   Chief Complaint   Patient presents with     Derm Problem     Marli is here for a skin check, states no concerns.         Encounter Date: May 29, 2019    History of Present Illness:  Ms. Marli Cherry is a 44 year old female who with a history of skin cancer presents for her FBSE.  Last seen 3 months ago for a spot check of the recurrent BCC of the left chest.  It was removed via shave biopsy in Spring 2019 anther recurred in 2018.  Removed again via shave biopsy 2018 and after consult with Dr Valentine 7/2018, the plan was to monitor this site.  No new tender or bleeding lesions.  She notes a new red area on the left jawline which comes and goes but is red after showering.    Past Medical History:   There is no problem list on file for this patient.    Past Medical History:   Diagnosis Date     Infertility, female      Skin cancer of chest, excluding breast      Past Surgical History:   Procedure Laterality Date     BIOPSY OF SKIN LESION       LAPAROSCOPY         Social History:  Patient reports that she has never smoked. She has never used smokeless tobacco. She reports that she drinks about 8.4 oz of alcohol per week.    Family History:  Family History   Problem Relation Age of Onset     Hypertension Father      Hyperlipidemia Father      Heart Disease Maternal Grandmother      Breast Cancer Maternal Grandmother      Melanoma No family hx of      Skin Cancer No family hx of        Medications:  Current Outpatient Medications   Medication Sig Dispense Refill     loratadine (CLARITIN) 10 MG tablet Take 10 mg by mouth       Allergies   Allergen Reactions     Seasonal Allergies              Physical  exam:  Vitals: There were no vitals taken for this visit.  GEN: This is a well developed, well-nourished female in no acute distress, in a pleasant mood.    SKIN: Full skin, which includes the head/face, both arms, chest, back, abdomen,both legs, buttocks, digits and/or nails, was examined.  -There is an erythematous macule with overyling adherent scale on the left jawline. Not tender or friable on palpation.  -There is no erythema, telangectasias, nodularity, or pigmentation on the left chest.  -Multiple regular brown pigmented macules and papules are identified on the exam.  No atypia on dermoscopy.   -There are waxy stuck on tan to brown papules on the trunk and extremities.  -No other lesions of concern on areas examined.     Impression/Plan:  1. Actinic keratosis left jjawline    Cryotherapy procedure note: After verbal consent and discussion of risks and benefits including but no limited to dyspigmentation/scar, blister, and pain, one was(were) treated with 1-2mm freeze border for 2 cycles with liquid nitrogen. Post cryotherapy instructions were provided.      2. History of nonmelanoma skin cancer, no clincial evidence of recurrence    We will clinically monitor this area.      3. Multiple clinically benign nevi on the exam    Her signature nevus is a dark brown macule with even reticular pigmentation.      4. Seborrheic keratosis, non irritated    Reassurance.      CC Samira Cuellar  [facility]  [address] [place] [address] on close of this encounter.  Follow-up in 6 months, earlier for new or changing lesions.       Staff Involved:  Staff Only

## 2019-05-29 NOTE — LETTER
5/29/2019       RE: Marli Cherry  [address]  [facility] [address]     Dear Colleague,    Thank you for referring your patient, Marli Cherry, to the [facility] DERMATOLOGY at [facility]. Please see a copy of my visit note below.    [facility] Dermatology Note      Dermatology Problem List:    1. History of NMSC:  - Recurrent BCC, L upper chest, s/p Mohs   - BCC, L upper chest, shave removal in Spring 2016  2. Multiple melanocytic nevi  3. **Lesions to monitor   - L upper chest, BCC w/ recurrence. NERD 02/11/19      CC:   Chief Complaint   Patient presents with     Derm Problem     Marli is here for a skin check, states no concerns.         Encounter Date: May 29, 2019    History of Present Illness:  Ms. Marli Cherry is a 44 year old female who with a history of skin cancer presents for her FBSE.  Last seen 3 months ago for a spot check of the recurrent BCC of the left chest.  It was removed via shave biopsy in Spring 2019 anther recurred in 2018.  Removed again via shave biopsy 2018 and after consult with Dr Valentine 7/2018, the plan was to monitor this site.  No new tender or bleeding lesions.  She notes a new red area on the left jawline which comes and goes but is red after showering.    Past Medical History:   There is no problem list on file for this patient.    Past Medical History:   Diagnosis Date     Infertility, female      Skin cancer of chest, excluding breast      Past Surgical History:   Procedure Laterality Date     BIOPSY OF SKIN LESION       LAPAROSCOPY         Social History:  Patient reports that she has never smoked. She has never used smokeless tobacco. She reports that she drinks about 8.4 oz of alcohol per week.    Family History:  Family History   Problem Relation Age of Onset     Hypertension Father      Hyperlipidemia Father      Heart Disease Maternal Grandmother      Breast Cancer Maternal Grandmother      Melanoma No  family hx of      Skin Cancer No family hx of        Medications:  Current Outpatient Medications   Medication Sig Dispense Refill     loratadine (CLARITIN) 10 MG tablet Take 10 mg by mouth       Allergies   Allergen Reactions     Seasonal Allergies              Physical exam:  Vitals: There were no vitals taken for this visit.  GEN: This is a well developed, well-nourished female in no acute distress, in a pleasant mood.    SKIN: Full skin, which includes the head/face, both arms, chest, back, abdomen,both legs, buttocks, digits and/or nails, was examined.  -There is an erythematous macule with overyling adherent scale on the left jawline. Not tender or friable on palpation.  -There is no erythema, telangectasias, nodularity, or pigmentation on the left chest.  -Multiple regular brown pigmented macules and papules are identified on the exam.  No atypia on dermoscopy.   -There are waxy stuck on tan to brown papules on the trunk and extremities.  -No other lesions of concern on areas examined.     Impression/Plan:  1. Actinic keratosis left jwline    Cryotherapy procedure note: After verbal consent and discussion of risks and benefits including but no limited to dyspigmentation/scar, blister, and pain, one was(were) treated with 1-2mm freeze border for 2 cycles with liquid nitrogen. Post cryotherapy instructions were provided.      2. History of nonmelanoma skin cancer, no clincial evidence of recurrence    We will clinically monitor this area.      3. Multiple clinically benign nevi on the exam    Her signature nevus is a dark brown macule with even reticular pigmentation.      4. Seborrheic keratosis, non irritated    Reassurance.      CC Samira Cuellar  [facility]  [address] [place] [address] on close of this encounter.  Follow-up in 6 months, earlier for new or changing lesions.       Staff Involved:  Staff Only    Again, thank you for allowing me to participate in the care of your  patient.      Sincerely,    Ciara Díaz MD

## 2019-05-31 ENCOUNTER — ANCILLARY PROCEDURE (OUTPATIENT)
Dept: MAMMOGRAPHY | Facility: CLINIC | Age: 44
End: 2019-05-31
Payer: COMMERCIAL

## 2019-05-31 DIAGNOSIS — Z12.31 VISIT FOR SCREENING MAMMOGRAM: ICD-10-CM

## 2019-07-08 ENCOUNTER — PRE VISIT (OUTPATIENT)
Dept: INTERNAL MEDICINE | Facility: CLINIC | Age: 44
End: 2019-07-08

## 2019-07-08 NOTE — TELEPHONE ENCOUNTER
[facility] PRIMARY [facility]ARE CLINIC  Dept: [phone]     Patient: Marli Cherry   : 1975  MRN: [id number]  Encounter: 19       Pre Visit Assessment    Health Maintenance Due   Topic Date Due     HIV SCREENING  04/15/1988     PHQ-2  2019     PREVENTIVE CARE VISIT  2019     Chart Reviewed for Labs needed/Health Maintenance: Yes   If labs needed, orders placed:  No,   Lab appointment scheduled:  No    Notes:  Patient confirmed they will attend appointment:  N/A  Appointment rescheduled?  N/A      Candis Wilson at 5:24 PM on 2019

## 2019-07-24 ENCOUNTER — OFFICE VISIT (OUTPATIENT)
Dept: INTERNAL MEDICINE | Facility: CLINIC | Age: 44
End: 2019-07-24
Payer: COMMERCIAL

## 2019-07-24 VITALS
OXYGEN SATURATION: 99 % | DIASTOLIC BLOOD PRESSURE: 83 MMHG | HEIGHT: 62 IN | WEIGHT: 117 LBS | BODY MASS INDEX: 21.53 KG/M2 | RESPIRATION RATE: 16 BRPM | HEART RATE: 70 BPM | TEMPERATURE: 97.5 F | SYSTOLIC BLOOD PRESSURE: 134 MMHG

## 2019-07-24 DIAGNOSIS — M54.50 BILATERAL LOW BACK PAIN WITHOUT SCIATICA, UNSPECIFIED CHRONICITY: Primary | ICD-10-CM

## 2019-07-24 DIAGNOSIS — Z00.00 ROUTINE GENERAL MEDICAL EXAMINATION AT A HEALTH CARE FACILITY: ICD-10-CM

## 2019-07-24 DIAGNOSIS — E04.9 ENLARGED THYROID: ICD-10-CM

## 2019-07-24 DIAGNOSIS — Z13.220 SCREENING FOR HYPERLIPIDEMIA: ICD-10-CM

## 2019-07-24 RX ORDER — IBUPROFEN 200 MG
200 TABLET ORAL PRN
COMMUNITY
Start: 2015-07-22

## 2019-07-24 ASSESSMENT — MIFFLIN-ST. JEOR: SCORE: 1125.96

## 2019-07-24 ASSESSMENT — PAIN SCALES - GENERAL: PAINLEVEL: NO PAIN (0)

## 2019-07-24 NOTE — PATIENT INSTRUCTIONS
[facility] Medication Refill Request Information:  * Please contact your pharmacy regarding ANY request for medication refills.  ** [facility] Prescription Fax = [phone]  * Please allow 3 business days for routine medication refills.  * Please allow 5 business days for controlled substance medication refills.     [facility] Test Result notification information:  *You will be notified with in 7-10 days of your appointment day regarding the results of your test.  If you are on MyChart you will be notified as soon as the provider has reviewed the results and signed off on them.    [facility]: [phone]

## 2019-07-24 NOTE — PROGRESS NOTES
"[facility]  Primary Care Center   Adelaide Correa MD  07/24/2019      Chief Complaint: Physical     History of Present Illness:   Marli Cherry is a 44 year old female who presents for annual physical exam.    Low back pain:  The patient reports that she hurt her lower back a couple years ago after a fall on wet rocks. Two days after the fall she was unable to get out of bed. The pain went away over time with doing yoga and she was nearly pain free for 2 years. She reports that this January she \"tweaked\" her back again and experienced 3 months of low back pain, radiating to her bilateral hips. She reports she was less active due to the pain and notes some weight gain. She is now feeling better and is easing back into exercise using a Peloton and doing yoga. She states she tried to get into PT at the time of her original injury, but it took a long time to get an appointment and her pain had improved by that time, so she did not go.     Weight gain:  The patient reports recent weight gain which she attributes to being less active with her back injury. She states she normally weighs less than 110 lbs, but is currently 117 lbs. She is starting to exercise again. She denies recent dietary changes.     Allergies:  The patient reports continued allergy symptoms and one headache weekly. She believes her headaches are related to her sinuses and triggered by changing air pressure and humidity levels. She manages her allergy symptoms with Claritin and Flonase. She denies abdominal pain, diarrhea, constipation, recent illnesses, trouble swallowing, voice changes, or vision changes. She denies feeling down or depressed.     Other concerns discussed:  1) The patient is seeing a dermatologist every 6 months for skin checks. She denies recent new skin cancer findings. She wears sunscreen.   2) She is seeing a gynecologist for her gynecologic concerns/health maintenance.  3) She notes a family history of having a harris " "neck/thyroid. She denies family history of thyroid cancer or other disease.    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  Last pap 9/15  No abnormal paps  Abnormal menses every 24-31 days  No abnormal mammos     Review of Systems:   Pertinent items are noted in HPI or as in patient entered ROS below, remainder of complete ROS is negative.     Active Medications:      ibuprofen (ADVIL/MOTRIN) 200 MG tablet, Take 200 mg by mouth as needed for headaches, Disp: , Rfl:      loratadine (CLARITIN) 10 MG tablet, Take 10 mg by mouth daily , Disp: , Rfl:       Allergies:   Seasonal allergies      Past Medical History:  Infertility, female  Skin cancer of chest, excluding breast     Past Surgical History:  Biopsy of skin lesion  Laparoscopy    Family History:   Father - hypertension, hyperlipidemia  Maternal grandmother - heart disease, breast cancer     Social History:   Presents to clinic alone.   Tobacco Use: No previous or current tobacco use.   Alcohol Use: Yes, 14 glasses of wine weekly  The patient works full time. She travels regularly with her .      Physical Exam:   /83   Pulse 70   Temp 97.5  F (36.4  C) (Oral)   Resp 16   Ht 1.578 m (5' 2.13\")   Wt 53.1 kg (117 lb)   SpO2 99%   Breastfeeding? No   BMI 21.31 kg/m       Constitutional: Alert, oriented, pleasant, no acute distress  Head: Normocephalic, atraumatic  Eyes: Extra-ocular movements intact, no scleral icterus  ENT: Oropharynx clear, moist mucus membranes, good dentition  Neck: Supple, no lymphadenopathy, thyromegaly noted without discrete nodules palpated  Cardiovascular: Regular rate and rhythm, no murmurs, rubs or gallops, peripheral pulses full/symmetric  Respiratory: Good air movement bilaterally, lungs clear, no wheezes/rales/rhonchi  GI: Abdomen soft, bowel sounds present, nondistended, nontender, " no organomegaly or masses, no rebound/guarding  Musculoskeletal: No edema, normal muscle tone, normal gait  Neurologic: Alert and oriented, cranial nerves 2-12 intact.  Skin: No rashes/lesions. Multiple benign appearing nevi on legs. Full skin exam not completed.   Psychiatric: normal mentation, affect and mood     Assessment and Plan:  Bilateral low back pain without sciatica, unspecified chronicity - Patient reports recent flare of low back pain after injury several years ago. Pain improved over 3 months with yoga. Discussed doing PT in the future if pain returns. Suspect her weight will normalize now that she is back to her normal exercise routine. Encouraged to continue yoga and other regular exercise.     Enlarged thyroid - Patient has a history of enlarged thyroid with normal TSH. Denies family history of thyroid disease. Rechecking TSH today. Ordered thyroid US to rule out concerning causes of thyroid enlargement.   - TSH with free T4 reflex  - US Thyroid    Screening for hyperlipidemia  - Lipid panel reflex to direct LDL Fasting    Routine general medical examination at a health care facility  - Creatinine  - Hemoglobin  - Glucose     Routine Health Maintenance  Immunizations (zoster, pneumovax, flu, Tdap, Hep A/B):   Most Recent Immunizations   Administered Date(s) Administered     Influenza (IIV3) PF 09/14/2016     TDAP Vaccine (Boostrix) 11/23/2011     Lipids:   Recent Labs   Lab Test 04/30/18  0811 04/24/17  0853   CHOL 185 190   HDL 66 86   LDL 90 78   TRIG 143 130     AAA Screening (65-75 yrs): n/a  Lung Ca Screening (>30 pk age 55-79 or >20 py age 50-79 + RF): n/a  Colonoscopy (50-75 yrs): n/a  Dexa (>65W or 70M yrs): n/a  Mammogram (40-75 yrs): 5/2019 neg  Pap (21-65 yrs): [place]necologist [place], 2015 per patient  Pelvic/Breast: Done with gynecologist [place]  Safety/Lifestyle: Reviewed  Tob/EtOH: Reviewed   Depression: Negative screening  PHQ-2 Score:     PHQ-2 ( 1999 Pfizer) 7/24/2019  5/30/2018   Q1: Little interest or pleasure in doing things 0 0   Q2: Feeling down, depressed or hopeless 0 0   PHQ-2 Score 0 0   Advanced Directive: Not discussed    Follow-up: Return in about 1 year (around 7/24/2020).         Scribe Disclosure:  I, Maciricky Savage, am serving as a scribe to document services personally performed by Adelaide Correa MD at this visit, based upon the provider's statements to me. All documentation has been reviewed by the aforementioned provider prior to being entered into the official medical record.     Portions of this medical record were completed by a scribe. UPON MY REVIEW AND AUTHENTICATION BY ELECTRONIC SIGNATURE, this confirms (a) I performed the applicable clinical services, and (b) the record is accurate.   Adelaide Correa MD  Internal Medicine

## 2019-07-24 NOTE — PROGRESS NOTES
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  Last pap 9/15  No abnormal paps  Abnormal menses every 24-31 days  No abnormal mammos       Routine Health Maintenence:  Immunizations (zoster, pneumovax, flu, Tdap, Hep A/B):        Most Recent Immunizations   Administered Date(s) Administered     Influenza (IIV3) 2016     TDAP Vaccine (Boostrix) 2011      Lipids: ordered  Lung Ca Screening (>30 pk age 55-79 or >20 py age 50-79 + RF): n/a  Colonoscopy (50-75 yrs): n/a  Dexa (>65W or 70M yrs): n/a  Mammogram (40-75 yrs): ordered  Pap (21-65 yrs):  9/15 normal per patient  Pelvic/Breast: breast today  GC/Chlam (<25 yrs): n/a  HIV/HCV if risk factors: deferred  Safety/Lifestyle: reviewed  Tob/EtOH: screened  Depression: screen neg  Advanced Directive: deferred

## 2019-07-24 NOTE — NURSING NOTE
Chief Complaint   Patient presents with     Physical     Pt is here for annual physical.      Candis Elziabeth LPN at 7:47 AM on 7/24/2019.

## 2019-07-29 ENCOUNTER — ANCILLARY PROCEDURE (OUTPATIENT)
Dept: ULTRASOUND IMAGING | Facility: CLINIC | Age: 44
End: 2019-07-29
Attending: INTERNAL MEDICINE
Payer: COMMERCIAL

## 2019-07-29 DIAGNOSIS — Z00.00 ROUTINE GENERAL MEDICAL EXAMINATION AT A HEALTH CARE FACILITY: ICD-10-CM

## 2019-07-29 DIAGNOSIS — E04.9 ENLARGED THYROID: ICD-10-CM

## 2019-07-29 DIAGNOSIS — Z13.220 SCREENING FOR HYPERLIPIDEMIA: ICD-10-CM

## 2019-07-29 LAB
CHOLEST SERPL-MCNC: 178 MG/DL
CREAT SERPL-MCNC: 0.66 MG/DL (ref 0.52–1.04)
GFR SERPL CREATININE-BSD FRML MDRD: >90 ML/MIN/{1.73_M2}
HDLC SERPL-MCNC: 70 MG/DL
HGB BLD-MCNC: 12.6 G/DL (ref 11.7–15.7)
LDLC SERPL CALC-MCNC: 77 MG/DL
NONHDLC SERPL-MCNC: 109 MG/DL
TRIGL SERPL-MCNC: 158 MG/DL
TSH SERPL DL<=0.005 MIU/L-ACNC: 2.5 MU/L (ref 0.4–4)

## 2020-03-10 ENCOUNTER — HEALTH MAINTENANCE LETTER (OUTPATIENT)
Age: 45
End: 2020-03-10

## 2020-08-06 ASSESSMENT — ENCOUNTER SYMPTOMS
TASTE DISTURBANCE: 0
SORE THROAT: 0
BLOOD IN STOOL: 0
SINUS CONGESTION: 1
JAUNDICE: 0
SINUS PAIN: 0
NECK MASS: 0
SMELL DISTURBANCE: 0
CONSTIPATION: 0
TROUBLE SWALLOWING: 0
BOWEL INCONTINENCE: 0
RECTAL PAIN: 0
HOARSE VOICE: 0
HEARTBURN: 0
BLOATING: 1
DECREASED LIBIDO: 0
DIARRHEA: 0
NAUSEA: 0
ABDOMINAL PAIN: 0
HOT FLASHES: 0
VOMITING: 0

## 2020-08-20 ENCOUNTER — OFFICE VISIT (OUTPATIENT)
Dept: INTERNAL MEDICINE | Facility: CLINIC | Age: 45
End: 2020-08-20
Payer: COMMERCIAL

## 2020-08-20 ENCOUNTER — ANCILLARY PROCEDURE (OUTPATIENT)
Dept: MAMMOGRAPHY | Facility: CLINIC | Age: 45
End: 2020-08-20
Attending: INTERNAL MEDICINE
Payer: COMMERCIAL

## 2020-08-20 VITALS
SYSTOLIC BLOOD PRESSURE: 129 MMHG | WEIGHT: 119 LBS | BODY MASS INDEX: 21.68 KG/M2 | HEART RATE: 70 BPM | OXYGEN SATURATION: 100 % | DIASTOLIC BLOOD PRESSURE: 88 MMHG

## 2020-08-20 DIAGNOSIS — Z00.00 ROUTINE GENERAL MEDICAL EXAMINATION AT A HEALTH CARE FACILITY: ICD-10-CM

## 2020-08-20 DIAGNOSIS — Z12.31 ENCOUNTER FOR SCREENING MAMMOGRAM FOR BREAST CANCER: ICD-10-CM

## 2020-08-20 DIAGNOSIS — Z12.4 SCREENING FOR CERVICAL CANCER: Primary | ICD-10-CM

## 2020-08-20 DIAGNOSIS — R03.0 ELEVATED BP WITHOUT DIAGNOSIS OF HYPERTENSION: ICD-10-CM

## 2020-08-20 DIAGNOSIS — Z12.31 VISIT FOR SCREENING MAMMOGRAM: ICD-10-CM

## 2020-08-20 ASSESSMENT — PAIN SCALES - GENERAL: PAINLEVEL: NO PAIN (0)

## 2020-08-20 NOTE — PATIENT INSTRUCTIONS
[facility] Medication Refill Request Information:  * Please contact your pharmacy regarding ANY request for medication refills.  ** [facility] Prescription Fax = [phone]  * Please allow 3 business days for routine medication refills.  * Please allow 5 business days for controlled substance medication refills.     [facility] Test Result notification information:  *You will be notified with in 7-10 days of your appointment day regarding the results of your test.  If you are on MyChart you will be notified as soon as the provider has reviewed the results and signed off on them.    [facility]: [phone]

## 2020-08-20 NOTE — NURSING NOTE
Chief Complaint   Patient presents with     Physical     Pt is here for annual physical.      Candis Elizabeth LPN at 9:05 AM on 8/20/2020.

## 2020-08-20 NOTE — PROGRESS NOTES
History of Present Illness:   Marli is here for physical. Working at home during COVID.    Has been trying to exercise more, hits max heart rate, working on interval training.  Feels tired after.  Heart not irregular. No chest pain.    Cycles are unpredictable.  41-23 day intervals.  Has not seen gynecologist.  Has ovarian cyst on the left. Not getting hot flashes, not on hormones.    Stress and sleep are good.  No weight changes. No major back pain.  No skin concerns.  Feeling good overall without concerns.      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  Last pap 9/15  No abnormal paps  Abnormal menses every 24-31 days  No abnormal mammos      Routine Health Maintenance  Immunizations (zoster, pneumovax, flu, Tdap, Hep A/B):   Most Recent Immunizations   Administered Date(s) Administered     Influenza (IIV3) PF 2016     Influenza Vaccine IM > 6 months Valent IIV4 2019     TDAP Vaccine (Boostrix) 2011     Lipids:   Recent Labs   Lab Test 18  0811 17  0853   CHOL 185 190   HDL 66 86   LDL 90 78   TRIG 143 130     AAA Screening (65-75 yrs): n/a  Lung Ca Screening (>30 pk age 55-79 or >20 py age 50-79 + RF): n/a  Colonoscopy (50-75 yrs): n/a  Dexa (>65W or 70M yrs): n/a  Mammogram (40-75 yrs): 2019 neg, done today  Pap (21-65 yrs): Done today  Pelvic/Breast: today  Safety/Lifestyle: Reviewed  Tob/EtOH: Reviewed   Depression: Negative screening  PHQ-2 Score:     PHQ-2 (  Pf[employer]) 2020   Q1: Little interest or pleasure in doing things 0 0   Q2: Feeling down, depressed or hopeless 0 0   PHQ-2 Score 0 0   Advanced Directive: Not discussed      A full 10-pt Review of Systems was performed, verified and is negative except as documented in the HPI.  All health questionnaires were reviewed, verified and relevant information documented  above.      Past Medical History:  Past Medical History:   Diagnosis Date     Acute tension headache      Infertility, female      Skin cancer of chest, excluding breast     basal cell L chest       Active Meds:  Current Outpatient Medications   Medication     ibuprofen (ADVIL/MOTRIN) 200 MG tablet     loratadine (CLARITIN) 10 MG tablet     No current facility-administered medications for this visit.         Allergies:  Reviewed, refer to EMR    Relevant Social History:  Social History     Tobacco Use     Smoking status: Never Smoker     Smokeless tobacco: Never Used   Substance Use Topics     Alcohol use: Yes     Alcohol/week: 14.0 standard drinks     Types: 14 Glasses of wine per week     Drug use: None         Physical Exam:  Vitals: /88   Pulse 70   Wt 54 kg (119 lb)   SpO2 100%   BMI 21.68 kg/m    Constitutional: Alert, oriented, pleasant, no acute distress  Head: Normocephalic, atraumatic  Eyes: Extra-ocular movements intact, pupils equally round and reactive bilaterally, no scleral icterus  ENT: Oropharynx clear, moist mucus membranes, good dentition  Neck: Supple, no lymphadenopathy, no thyromegaly  Cardiovascular: Regular rate and rhythm, no murmurs, rubs or gallops, peripheral pulses full/symmetric  Respiratory: Good air movement bilaterally, lungs clear, no wheezes/rales/rhonchi  Breasts: normal without suspicious masses, skin changes or axillary nodes.  Vulva: No external lesions, normal hair distribution, no adenopathy  Vagina: Moist, pink, no abnormal discharge, well rugated, no lesions, urethra normal, perineum normal  Cervix: Pap smear is taken, smooth, pink, no visible lesions  Uterus: Normal size, anteverted, non-tender, mobile  Ovaries: No mass, non-tender, mobile  GI: Abdomen soft, bowel sounds present, nondistended, nontender, no organomegaly or masses, no rebound/guarding  Musculoskeletal: No edema, normal muscle tone, normal gait  Neurologic: Alert and oriented, cranial nerves 2-12  intact  Skin: No rashes/lesions, several benign appearing nevi  Psychiatric: normal mentation, affect and mood        Assessment and Plan:    Marli was seen today for physical.    Diagnoses and all orders for this visit:    Screening for cervical cancer  -     Pap imaged thin layer screen with HPV - recommended age 30 - 65 years (select HPV order below)  -     HPV High Risk Types DNA Cervical  -     Pelvic and Breast Exam Procedure (exam)    Encounter for screening mammogram for breast cancer  -     Pelvic and Breast Exam Procedure (exam)    Routine general medical examination at a health care facility  -     Pap imaged thin layer screen with HPV - recommended age 30 - 65 years (select HPV order below)  -     HPV High Risk Types DNA Cervical  -     Pelvic and Breast Exam Procedure (exam)    Elevated BP without diagnosis of hypertension  Discussed lifestyle modifications, specifically limiting EtOH.  AHA --> was 129/88.  Advised continued monitoring.      Return to clinic: annually or prn    Adelaide Correa MD  Internal Medicine            Answers for HPI/ROS submitted by the patient on 8/6/2020   General Symptoms: No  Skin Symptoms: No  HENT Symptoms: Yes  EYE SYMPTOMS: No  HEART SYMPTOMS: No  LUNG SYMPTOMS: No  INTESTINAL SYMPTOMS: Yes  URINARY SYMPTOMS: No  GYNECOLOGIC SYMPTOMS: Yes  BREAST SYMPTOMS: No  SKELETAL SYMPTOMS: No  BLOOD SYMPTOMS: No  NERVOUS SYSTEM SYMPTOMS: No  MENTAL HEALTH SYMPTOMS: No  Ear pain: No  Ear discharge: No  Hearing loss: No  Tinnitus: No  Nosebleeds: Yes  Congestion: Yes  Sinus pain: No  Trouble swallowing: No   Voice hoarseness: No  Mouth sores: No  Sore throat: No  Tooth pain: No  Gum tenderness: No  Bleeding gums: No  Change in taste: No  Change in sense of smell: No  Dry mouth: No  Hearing aid used: No  Neck lump: No  Heart burn or indigestion: No  Nausea: No  Vomiting: No  Abdominal pain: No  Bloating: Yes  Constipation: No  Diarrhea: No  Blood in stool: No  Black stools: No  Rectal  or Anal pain: No  Fecal incontinence: No  Yellowing of skin or eyes: No  Vomit with blood: No  Change in stools: No  Bleeding or spotting between periods: Yes  Heavy or painful periods: Yes  Irregular periods: Yes  Vaginal discharge: No  Hot flashes: No  Vaginal dryness: No  Genital ulcers: No  Reduced libido: No  Painful intercourse: No  Difficulty with sexual arousal: No  Post-menopausal bleeding: No

## 2020-08-24 LAB
COPATH REPORT: NORMAL
PAP: NORMAL

## 2020-08-26 LAB
FINAL DIAGNOSIS: NORMAL
HPV HR 12 DNA CVX QL NAA+PROBE: NEGATIVE
HPV16 DNA SPEC QL NAA+PROBE: NEGATIVE
HPV18 DNA SPEC QL NAA+PROBE: NEGATIVE
SPECIMEN DESCRIPTION: NORMAL
SPECIMEN SOURCE CVX/VAG CYTO: NORMAL

## 2020-12-27 ENCOUNTER — HEALTH MAINTENANCE LETTER (OUTPATIENT)
Age: 45
End: 2020-12-27

## 2021-05-11 ENCOUNTER — E-VISIT (OUTPATIENT)
Dept: URGENT CARE | Facility: URGENT CARE | Age: 46
End: 2021-05-11

## 2021-05-11 ENCOUNTER — E-VISIT (OUTPATIENT)
Dept: URGENT CARE | Facility: URGENT CARE | Age: 46
End: 2021-05-11
Payer: COMMERCIAL

## 2021-05-11 DIAGNOSIS — J30.2 SEASONAL ALLERGIES: ICD-10-CM

## 2021-05-11 DIAGNOSIS — J06.9 VIRAL URI WITH COUGH: ICD-10-CM

## 2021-05-11 DIAGNOSIS — J06.9 UPPER RESPIRATORY TRACT INFECTION, UNSPECIFIED TYPE: ICD-10-CM

## 2021-05-11 DIAGNOSIS — H10.12 ALLERGIC CONJUNCTIVITIS, LEFT: ICD-10-CM

## 2021-05-11 PROCEDURE — 99207 PR NON-BILLABLE SERV PER CHARTING: CPT | Performed by: PHYSICIAN ASSISTANT

## 2021-05-11 PROCEDURE — 99421 OL DIG E/M SVC 5-10 MIN: CPT | Performed by: PHYSICIAN ASSISTANT

## 2021-05-11 RX ORDER — LORATADINE 10 MG/1
10 TABLET ORAL DAILY
Qty: 30 TABLET | Refills: 0 | Status: SHIPPED | OUTPATIENT
Start: 2021-05-11

## 2021-05-11 RX ORDER — FLUTICASONE PROPIONATE 50 MCG
2 SPRAY, SUSPENSION (ML) NASAL DAILY
Qty: 16 G | Refills: 0 | Status: SHIPPED | OUTPATIENT
Start: 2021-05-11

## 2021-05-11 RX ORDER — OLOPATADINE HYDROCHLORIDE 1 MG/ML
1 SOLUTION/ DROPS OPHTHALMIC 2 TIMES DAILY
Qty: 5 ML | Refills: 0 | Status: SHIPPED | OUTPATIENT
Start: 2021-05-11 | End: 2022-04-08

## 2021-05-11 RX ORDER — BENZONATATE 200 MG/1
200 CAPSULE ORAL 3 TIMES DAILY PRN
Qty: 21 CAPSULE | Refills: 0 | Status: SHIPPED | OUTPATIENT
Start: 2021-05-11 | End: 2021-05-11

## 2021-05-11 RX ORDER — LORATADINE 10 MG/1
10 TABLET ORAL DAILY
Qty: 30 TABLET | Refills: 0 | Status: SHIPPED | OUTPATIENT
Start: 2021-05-11 | End: 2022-04-08

## 2021-10-04 ENCOUNTER — ANCILLARY PROCEDURE (OUTPATIENT)
Dept: MAMMOGRAPHY | Facility: CLINIC | Age: 46
End: 2021-10-04
Attending: INTERNAL MEDICINE
Payer: COMMERCIAL

## 2021-10-04 DIAGNOSIS — Z12.31 VISIT FOR SCREENING MAMMOGRAM: ICD-10-CM

## 2021-10-04 PROCEDURE — 77067 SCR MAMMO BI INCL CAD: CPT | Mod: GC | Performed by: RADIOLOGY

## 2021-10-04 PROCEDURE — 77063 BREAST TOMOSYNTHESIS BI: CPT | Mod: GC | Performed by: RADIOLOGY

## 2021-10-09 ENCOUNTER — HEALTH MAINTENANCE LETTER (OUTPATIENT)
Age: 46
End: 2021-10-09

## 2022-04-07 ASSESSMENT — ENCOUNTER SYMPTOMS
VOMITING: 0
JAUNDICE: 0
RECTAL PAIN: 0
HOT FLASHES: 0
BLOATING: 1
DECREASED LIBIDO: 0
HEARTBURN: 0
BOWEL INCONTINENCE: 0
CONSTIPATION: 1
BLOOD IN STOOL: 0
DIARRHEA: 0
NAUSEA: 0
ABDOMINAL PAIN: 1

## 2022-04-08 ENCOUNTER — LAB (OUTPATIENT)
Dept: LAB | Facility: CLINIC | Age: 47
End: 2022-04-08
Payer: COMMERCIAL

## 2022-04-08 ENCOUNTER — OFFICE VISIT (OUTPATIENT)
Dept: INTERNAL MEDICINE | Facility: CLINIC | Age: 47
End: 2022-04-08
Payer: COMMERCIAL

## 2022-04-08 VITALS
WEIGHT: 117 LBS | BODY MASS INDEX: 21.53 KG/M2 | HEIGHT: 62 IN | TEMPERATURE: 98.3 F | SYSTOLIC BLOOD PRESSURE: 129 MMHG | DIASTOLIC BLOOD PRESSURE: 89 MMHG | HEART RATE: 84 BPM | RESPIRATION RATE: 16 BRPM | OXYGEN SATURATION: 100 %

## 2022-04-08 DIAGNOSIS — E55.9 VITAMIN D DEFICIENCY: ICD-10-CM

## 2022-04-08 DIAGNOSIS — Z00.00 ROUTINE GENERAL MEDICAL EXAMINATION AT A HEALTH CARE FACILITY: Primary | ICD-10-CM

## 2022-04-08 DIAGNOSIS — Z13.220 SCREENING FOR HYPERLIPIDEMIA: ICD-10-CM

## 2022-04-08 DIAGNOSIS — K58.9 IRRITABLE BOWEL SYNDROME, UNSPECIFIED TYPE: ICD-10-CM

## 2022-04-08 DIAGNOSIS — Z00.00 ROUTINE GENERAL MEDICAL EXAMINATION AT A HEALTH CARE FACILITY: ICD-10-CM

## 2022-04-08 DIAGNOSIS — Z12.31 ENCOUNTER FOR SCREENING MAMMOGRAM FOR BREAST CANCER: ICD-10-CM

## 2022-04-08 LAB
ALBUMIN SERPL-MCNC: 4 G/DL (ref 3.4–5)
ALP SERPL-CCNC: 55 U/L (ref 40–150)
ALT SERPL W P-5'-P-CCNC: 19 U/L (ref 0–50)
ANION GAP SERPL CALCULATED.3IONS-SCNC: 6 MMOL/L (ref 3–14)
AST SERPL W P-5'-P-CCNC: 11 U/L (ref 0–45)
BILIRUB SERPL-MCNC: 0.4 MG/DL (ref 0.2–1.3)
BUN SERPL-MCNC: 14 MG/DL (ref 7–30)
CALCIUM SERPL-MCNC: 9.5 MG/DL (ref 8.5–10.1)
CHLORIDE BLD-SCNC: 103 MMOL/L (ref 94–109)
CHOLEST SERPL-MCNC: 192 MG/DL
CO2 SERPL-SCNC: 28 MMOL/L (ref 20–32)
CREAT SERPL-MCNC: 0.76 MG/DL (ref 0.52–1.04)
DEPRECATED CALCIDIOL+CALCIFEROL SERPL-MC: 23 UG/L (ref 20–75)
ERYTHROCYTE [DISTWIDTH] IN BLOOD BY AUTOMATED COUNT: 12.9 % (ref 10–15)
FASTING STATUS PATIENT QL REPORTED: NO
GFR SERPL CREATININE-BSD FRML MDRD: >90 ML/MIN/1.73M2
GLUCOSE BLD-MCNC: 99 MG/DL (ref 70–99)
HCT VFR BLD AUTO: 39.7 % (ref 35–47)
HDLC SERPL-MCNC: 63 MG/DL
HGB BLD-MCNC: 12.8 G/DL (ref 11.7–15.7)
LDLC SERPL CALC-MCNC: 106 MG/DL
MCH RBC QN AUTO: 29.4 PG (ref 26.5–33)
MCHC RBC AUTO-ENTMCNC: 32.2 G/DL (ref 31.5–36.5)
MCV RBC AUTO: 91 FL (ref 78–100)
NONHDLC SERPL-MCNC: 129 MG/DL
PLATELET # BLD AUTO: 289 10E3/UL (ref 150–450)
POTASSIUM BLD-SCNC: 4.8 MMOL/L (ref 3.4–5.3)
PROT SERPL-MCNC: 7.5 G/DL (ref 6.8–8.8)
RBC # BLD AUTO: 4.35 10E6/UL (ref 3.8–5.2)
SODIUM SERPL-SCNC: 137 MMOL/L (ref 133–144)
TRIGL SERPL-MCNC: 116 MG/DL
TSH SERPL DL<=0.005 MIU/L-ACNC: 1.13 MU/L (ref 0.4–4)
WBC # BLD AUTO: 7.1 10E3/UL (ref 4–11)

## 2022-04-08 PROCEDURE — 85027 COMPLETE CBC AUTOMATED: CPT | Performed by: PATHOLOGY

## 2022-04-08 PROCEDURE — 36415 COLL VENOUS BLD VENIPUNCTURE: CPT | Performed by: PATHOLOGY

## 2022-04-08 PROCEDURE — 80061 LIPID PANEL: CPT | Performed by: PATHOLOGY

## 2022-04-08 PROCEDURE — 82306 VITAMIN D 25 HYDROXY: CPT | Mod: 90 | Performed by: PATHOLOGY

## 2022-04-08 PROCEDURE — 84443 ASSAY THYROID STIM HORMONE: CPT | Performed by: PATHOLOGY

## 2022-04-08 PROCEDURE — 80053 COMPREHEN METABOLIC PANEL: CPT | Performed by: PATHOLOGY

## 2022-04-08 PROCEDURE — 99396 PREV VISIT EST AGE 40-64: CPT | Performed by: INTERNAL MEDICINE

## 2022-04-08 PROCEDURE — 99000 SPECIMEN HANDLING OFFICE-LAB: CPT | Performed by: PATHOLOGY

## 2022-04-08 ASSESSMENT — PAIN SCALES - GENERAL: PAINLEVEL: NO PAIN (0)

## 2022-04-08 NOTE — PROGRESS NOTES
Generally well, feels gassy bloated, daily.  Smoothie, soups, salads, lean protein/veggies, no snacking.  BM every other day, hard.  No satiety, nausea.  She did Everyly well test, no abnormalities.    She has appt scheduled with gyn given ovarian cyst.  Still getting cycles but flow varies.    Does yoga, jogging, strength, peloton.  Feels HR gets high, 170s.  220-50  170      Psyllium/bene  1-2 tsp twice panchal  miralax  gasex    mammo 10/22  Labs  GI  GI  Answers for HPI/ROS submitted by the patient on 4/7/2022  General Symptoms: No  Skin Symptoms: No  HENT Symptoms: No  EYE SYMPTOMS: No  HEART SYMPTOMS: No  LUNG SYMPTOMS: No  INTESTINAL SYMPTOMS: Yes  URINARY SYMPTOMS: No  GYNECOLOGIC SYMPTOMS: Yes  BREAST SYMPTOMS: No  SKELETAL SYMPTOMS: No  BLOOD SYMPTOMS: No  NERVOUS SYSTEM SYMPTOMS: No  MENTAL HEALTH SYMPTOMS: No  Heart burn or indigestion: No  Nausea: No  Vomiting: No  Abdominal pain: Yes  Bloating: Yes  Constipation: Yes  Diarrhea: No  Blood in stool: No  Black stools: No  Rectal or Anal pain: No  Fecal incontinence: No  Yellowing of skin or eyes: No  Vomit with blood: No  Change in stools: No  Bleeding or spotting between periods: No  Heavy or painful periods: Yes  Irregular periods: Yes  Vaginal discharge: No  Hot flashes: No  Vaginal dryness: No  Genital ulcers: No  Reduced libido: No  Painful intercourse: No  Difficulty with sexual arousal: No  Post-menopausal bleeding: No

## 2022-04-08 NOTE — PATIENT INSTRUCTIONS
Due for tetanus shot this year.    For GI symptoms, sounds a bit like Irritable Bowel Syndrome. But also eating fiber and veggies naturally causes bloating and gas through the digestive process.    Remember to drink plenty of water.  Can try adding in supplemental fiber for regularity.  Start 1 tsp fiber (metamucil, benefiber, citrucel) daily for a couple weeks then increase slowly up to 2 tb daily.  May cause some initial bloating/gas.    If constipated, can add in a little miralax, or prunes.    Can try GasEx tablets.

## 2022-04-08 NOTE — PROGRESS NOTES
History of Present Illness:  Ms. Cherry is a 46 year old female who presents for  Chief Complaint   Patient presents with     Physical     Patient comes in for a physical exam.     Generally well, feels gassy bloated, daily. Eats smoothie, soups, salads, lean protein/veggies, no snacking.  BM every other day, hard.  No satiety, nausea.  She did Everyly well test, no abnormalities.     She has appt scheduled with gyn given ovarian cyst.  Still getting cycles but flow varies.     Does yoga, jogging, strength, peloton.  Feels HR gets high fast, 170s.     Gyn History:  Goes to gym 4 days per week, Peloton, yoga  Walks to work  [place]   No children  Does administrative work       3 rounds IVF--unsuccessful  Last pap 9/15  No abnormal paps  Abnormal menses every 23-  No abnormal mammos      Routine Health Maintenance  Immunizations (zoster, pneumovax, flu, Tdap, Hep A/B):   Most Recent Immunizations   Administered Date(s) Administered     Influenza (IIV3) PF 2016     Influenza Vaccine IM > 6 months Valent IIV4 2019     TDAP Vaccine (Boostrix) 2011     Lipids:   Recent Labs   Lab Test 18  0811 17  0853   CHOL 185 190   HDL 66 86   LDL 90 78   TRIG 143 130     AAA Screening (65-75 yrs): n/a  Lung Ca Screening (>30 pk age 55-79 or >20 py age 50-79 + RF): n/a  Colonoscopy (50-75 yrs): discussed and advised checking insurance  Dexa (>65W or 70M yrs): n/a  Mammogram (40-75 yrs): 10/21, ordered  Pap (21-65 yrs): HPV neg   Lab Results   Component Value Date    PAP NIL 2020     Safety/Lifestyle: Reviewed  Tob/EtOH: Reviewed   PHQ-2 Score:     PHQ-2 (  [employer]) 2022   Q1: Little interest or pleasure in doing things 0 0   Q2: Feeling down, depressed or hopeless 0 0   PHQ-2 Score 0 0   PHQ-2 Total Score (12-17 Years)- Positive if 3 or more points; Administer PHQ-A if positive - 0     Advanced Directive: Not discussed        Review of external notes as  "documented above                   A detailed Review of Systems was performed, verified and is negative except as documented in the HPI.  All health questionnaires were reviewed, verified and relevant information documented above.    Past Medical History:  Past Medical History:   Diagnosis Date     Acute tension headache      Infertility, female      Skin cancer of chest, excluding breast     basal cell L chest       Past Surgical History:  Past Surgical History:   Procedure Laterality Date     BIOPSY OF SKIN LESION       LAPAROSCOPY         Active Meds:  Current Outpatient Medications   Medication     fluticasone (FLONASE) 50 MCG/ACT nasal spray     ibuprofen (ADVIL/MOTRIN) 200 MG tablet     loratadine (CLARITIN) 10 MG tablet     No current facility-administered medications for this visit.        Allergies:  Seasonal allergies    Family History:  family history includes Breast Cancer in her maternal grandmother; Breast Cancer (age of onset: 66) in her mother; Heart Disease in her maternal grandmother; Hyperlipidemia in her father; Hypertension in her father; Lung Cancer in her paternal grandmother; Obesity in her father and sister.    Social History:  Social History     Tobacco Use     Smoking status: Never Smoker     Smokeless tobacco: Never Used   Substance Use Topics     Alcohol use: Yes     Alcohol/week: 14.0 standard drinks     Types: 14 Glasses of wine per week     Drug use: None       Physical Exam:  Vitals: /89   Pulse 84   Temp 98.3  F (36.8  C) (Oral)   Resp 16   Ht 1.578 m (5' 2.13\")   Wt 53.1 kg (117 lb)   SpO2 100%   BMI 21.31 kg/m    Constitutional: Alert, oriented, pleasant, no acute distress  Head: Normocephalic, atraumatic  Eyes: Extra-ocular movements intact, pupils equally round and reactive bilaterally, no scleral icterus  ENT: Oropharynx clear, moist mucus membranes, good dentition  Neck: Supple, no lymphadenopathy  Cardiovascular: Regular rate and rhythm, no murmurs, rubs or " gallops, peripheral pulses full/symmetric  Respiratory: Good air movement bilaterally, lungs clear, no wheezes/rales/rhonchi  GI: Abdomen soft, bowel sounds present, nondistended, nontender, no organomegaly or masses, no rebound/guarding  Musculoskeletal: No edema, normal muscle tone, normal gait  Neurologic: Alert and oriented, cranial nerves 2-12 intact, grossly non-focal  Skin: No rashes/lesions  Psychiatric: normal mentation, affect and mood      Diagnostics:  Labs reviewed in Epic          Assessment and Plan:  Marli was seen today for physical.    Diagnoses and all orders for this visit:    Routine general medical examination at a health care facility  Plans to see Derm for skin check, mammo due fall, pap up to date.  Otherwise very healthy lifestyle.  -     Comprehensive metabolic panel; Future  -     CBC with platelets; Future    Irritable bowel syndrome, unspecified type  Discussed that dairy/veggies often cause gas/bloating. Gave ideas on symptomatic management. Could try cooking more veggies, drink more water. She will f/u with Gyn re: ovarian cyst.  -     TSH with free T4 reflex; Future    Screening for hyperlipidemia  -     Lipid panel reflex to direct LDL Fasting; Future    Vitamin D deficiency  -     Vitamin D Deficiency; Future    Encounter for screening mammogram for breast cancer  -     Mammogram, screening w jaime (3D); Future          Adelaide Correa MD  Internal Medicine                    Answers for HPI/ROS submitted by the patient on 4/7/2022  General Symptoms: No  Skin Symptoms: No  HENT Symptoms: No  EYE SYMPTOMS: No  HEART SYMPTOMS: No  LUNG SYMPTOMS: No  INTESTINAL SYMPTOMS: Yes  URINARY SYMPTOMS: No  GYNECOLOGIC SYMPTOMS: Yes  BREAST SYMPTOMS: No  SKELETAL SYMPTOMS: No  BLOOD SYMPTOMS: No  NERVOUS SYSTEM SYMPTOMS: No  MENTAL HEALTH SYMPTOMS: No  Heart burn or indigestion: No  Nausea: No  Vomiting: No  Abdominal pain: Yes  Bloating: Yes  Constipation: Yes  Diarrhea: No  Blood in stool:  No  Black stools: No  Rectal or Anal pain: No  Fecal incontinence: No  Yellowing of skin or eyes: No  Vomit with blood: No  Change in stools: No  Bleeding or spotting between periods: No  Heavy or painful periods: Yes  Irregular periods: Yes  Vaginal discharge: No  Hot flashes: No  Vaginal dryness: No  Genital ulcers: No  Reduced libido: No  Painful intercourse: No  Difficulty with sexual arousal: No  Post-menopausal bleeding: No

## 2022-04-08 NOTE — NURSING NOTE
Chief Complaint   Patient presents with     Physical     Patient comes in for a physical exam.         Aravind Gross MA on 4/8/2022 at 7:43 AM

## 2022-07-27 ENCOUNTER — MYC MEDICAL ADVICE (OUTPATIENT)
Dept: INTERNAL MEDICINE | Facility: CLINIC | Age: 47
End: 2022-07-27

## 2022-08-01 ENCOUNTER — OFFICE VISIT (OUTPATIENT)
Dept: INTERNAL MEDICINE | Facility: CLINIC | Age: 47
End: 2022-08-01
Payer: COMMERCIAL

## 2022-08-01 VITALS
SYSTOLIC BLOOD PRESSURE: 133 MMHG | HEIGHT: 62 IN | OXYGEN SATURATION: 100 % | TEMPERATURE: 98.5 F | RESPIRATION RATE: 16 BRPM | WEIGHT: 117.7 LBS | BODY MASS INDEX: 21.66 KG/M2 | DIASTOLIC BLOOD PRESSURE: 85 MMHG | HEART RATE: 85 BPM

## 2022-08-01 DIAGNOSIS — R14.0 BLOATING SYMPTOM: ICD-10-CM

## 2022-08-01 DIAGNOSIS — R10.31 RLQ ABDOMINAL PAIN: Primary | ICD-10-CM

## 2022-08-01 PROCEDURE — 99214 OFFICE O/P EST MOD 30 MIN: CPT | Performed by: INTERNAL MEDICINE

## 2022-08-01 ASSESSMENT — PAIN SCALES - GENERAL: PAINLEVEL: MODERATE PAIN (4)

## 2022-08-01 NOTE — NURSING NOTE
Chief Complaint   Patient presents with     Recheck Medication     Abdominal Pain       SHARON Grossman at 2:07 PM on 8/1/2022.

## 2022-08-01 NOTE — PROGRESS NOTES
History of Present Illness:  Ms. Cherry is a 46 year old female who presents for  Chief Complaint   Patient presents with     Recheck Medication     Abdominal Pain     Here for f/u of RLQ pain    Did see Gyn for R ovarian cyst, had TVUS which showed no cyst, did have small fibroids    Has lower R sided abd discomfort, may fluctuate with cycle, 2018 she did have an ovarian cyst  Feels like fullness, heaviness, rarely sharp pain, crampiness, associated with LBP  No constipation, diarrhea, no vomiting, some nausea, no blood in stools, no loss of weight, +bloating, no satiety, no urinary symptoms  Regular periods, no hx of endometriosis  No food triggers  No abd surgeries        Gyn History:  Goes to gym 4 days per week, Peloton, yoga  Walks to work  [place] with   No children  Does administrative work       3 rounds IVF--unsuccessful  Last pap 9/15  No abnormal paps  Abnormal menses every 23-41  No abnormal mammos                          A detailed Review of Systems was performed, verified and is negative except as documented in the HPI.  All health questionnaires were reviewed, verified and relevant information documented above.      Past Medical History:  Past Medical History:   Diagnosis Date     Acute tension headache      Infertility, female      Skin cancer of chest, excluding breast     basal cell L chest       Active Meds:  Current Outpatient Medications   Medication     fluticasone (FLONASE) 50 MCG/ACT nasal spray     ibuprofen (ADVIL/MOTRIN) 200 MG tablet     loratadine (CLARITIN) 10 MG tablet     No current facility-administered medications for this visit.        Allergies:  Reviewed, refer to EMR    Relevant Social History:  Social History     Tobacco Use     Smoking status: Never Smoker     Smokeless tobacco: Never Used   Substance Use Topics     Alcohol use: Yes     Alcohol/week: 3.0 standard drinks     Types: 3 Glasses of wine per week        Physical Exam:  Vitals: /85 (BP  "Location: Right arm, Patient Position: Sitting, Cuff Size: Adult Regular)   Pulse 85   Temp 98.5  F (36.9  C) (Oral)   Resp 16   Ht 1.578 m (5' 2.13\")   Wt 53.4 kg (117 lb 11.2 oz)   SpO2 100%   BMI 21.44 kg/m    Constitutional: Alert, oriented, pleasant, no acute distress  Head: Normocephalic, atraumatic  Eyes: Extra-ocular movements intact, pupils equally round bilaterally, no scleral icterus  ENT: Oropharynx clear, moist mucus membranes, good dentition  Neck: Supple, no lymphadenopathy  Cardiovascular: Regular rate and rhythm, no murmurs, rubs or gallops, peripheral pulses full/symmetric  Respiratory: Good air movement bilaterally, lungs clear, no wheezes/rales/rhonchi  GI: Abdomen soft, bowel sounds present, nondistended, not particularly tender with deep palpation in RLQ, no organomegaly or masses, no rebound/guarding  Musculoskeletal: No edema, normal muscle tone, normal gait  Neurologic: Alert and oriented, cranial nerves 2-12 intact, grossly non-focal  Skin: No rashes/lesions  Psychiatric: normal mentation, affect and mood      Diagnostics:  Labs reviewed in Epic          Assessment and Plan:  Marli was seen today for recheck medication and abdominal pain.    Diagnoses and all orders for this visit:    RLQ abdominal pain  Bloating symptom  Uncertain etiology, time pattern makes malignancy unlikely. Query diverticulosis, ?endometriosis, IBS. Less likely h pylori or SIBO.  May consider GI referral pending results.  -     CT Abdomen/Pelvis w/o & w contrast; Future  -     Colonscopy Screening  Referral; Future        Adelaide Correa MD  Internal Medicine                  "

## 2022-08-02 ENCOUNTER — HOSPITAL ENCOUNTER (OUTPATIENT)
Facility: AMBULATORY SURGERY CENTER | Age: 47
End: 2022-08-02
Attending: INTERNAL MEDICINE
Payer: COMMERCIAL

## 2022-08-02 ENCOUNTER — TELEPHONE (OUTPATIENT)
Dept: GASTROENTEROLOGY | Facility: CLINIC | Age: 47
End: 2022-08-02

## 2022-08-02 NOTE — TELEPHONE ENCOUNTER
Screening Questions    BlueKIND OF PREP RedLOCATION [review exclusion criteria] GreenSEDATION TYPE    Have you had a positive covid test in the last 90 days? N  If yes, what date?     Do you have a legal guardian or medical Power of ?  Are you able to give consent for your medical care?Y (Sedation review/consideration needed)    1. Are you active on mychart? Y    2. What insurance is in the chart? BCBS     3.   Ordering/Referring Provider: ABDI ALICIA    4. BMI 21.4 [BMI OVER 40-EXTENDED PREP]  If greater than 40 review exclusion criteria [PAC APPT IF (MAC) @ [facility]]      5.  Respiratory Screening:  [If yes to any of the following HOSPITAL setting only]     Do you use daily home oxygen? N    Do you have mod to severe Obstructive Sleep Apnea? N   [OKAY @ [facility] SH PH RI]   Do you have Pulmonary Hypertension? N     Do you have UNCONTROLLED asthma? N        6.   Have you had a heart or lung transplant? N      7.   Are you currently on dialysis? N [ If yes, G-PREP & HOSPITAL setting only]     8.   Do you have chronic kidney disease? N [ If yes, G-PREP ]    9.   Have you had a stroke or Transient ischemic attack (TIA - aka  mini stroke ) within 6 months?  N (If yes, please review exclusion criteria)         In the past 6 months, have you had any heart related issues including cardiomyopathy or heart attack? N           If yes, did it require cardiac stenting or other implantable device? N      10   Do you have any implantable devices in your body (pacemaker, defib, LVAD)? N (If yes, please review exclusion criteria)    11.   Do you take nitroglycerin? N            If yes, how often? N  (if yes, HOSPITAL setting ONLY)    12.   Are you currently taking any blood thinners? N           [IF YES, INFORM PATIENT TO FOLLOW UP W/ ORDERING PROVIDER FOR BRIDGING INSTRUCTIONS]     13.  22.  Do you take Phentermine? N     Yes-> Hold for 7 days before procedure.  Please consult your prescribing provider if you have  questions about holding this medication.    14.   Do you have a diagnosis of diabetes? N   [ If yes, G-PREP ]    15.   [FEMALES] Are you currently pregnant? N    If yes, how many weeks? N    16.   Are you taking any prescription pain medications on a routine schedule?  N  [ If yes, EXTENDED PREP.] [If yes, MAC]    17.   Do you have any chemical dependencies such as alcohol, street drugs, or methadone?  N [If yes, MAC]    18.   Do you have any history of post-traumatic stress syndrome, severe anxiety or history of psychosis?  N  [If yes, MAC]    19.   Do you transfer independently? (If NO, please HOSPITAL setting  only)  Y    20.  On a regular basis do you go 3-5 days between bowel movements? N   [ If yes, EXTENDED PREP.]    21.   Preferred LOCAL Pharmacy for Pre Prescription:                              [id number] [place], [place] - [address] AT [facility] [address]    Scheduling Details      Caller:   (Please ask for phone number if not scheduled by patient)    Type of Procedure Scheduled: Lower Endoscopy [Colonoscopy]    Which Colonoscopy Prep was Sent?: CHERIE JOHNSON CF PATIENTS & GROEN'S PATIENTS NEEDS EXTENDED PREP    Surgeon: LEVENTHAL  Date of Procedure: 10/26  Location: [facility]    Sedation Type: CS    Conscious Sedation- Needs  for 6 hours after the procedure  MAC/General-Needs  for 24 hours after procedure    Pre-op Required at [facility] [place], [place] and OR for MAC sedation:  N  (advise patient they will need a pre-op WITH IN 30 DAYS prior to procedure -)      Informed patient they will need an adult  Y  Cannot take any type of public or medical transportation alone    Pre-Procedure Covid test to [facility] completed [facility] or Externally: HOME    Confirmed Nurse will call to complete assessment Y    Additional comments:

## 2022-08-10 ENCOUNTER — ANCILLARY PROCEDURE (OUTPATIENT)
Dept: CT IMAGING | Facility: CLINIC | Age: 47
End: 2022-08-10
Attending: INTERNAL MEDICINE
Payer: COMMERCIAL

## 2022-08-10 ENCOUNTER — TELEPHONE (OUTPATIENT)
Dept: INTERNAL MEDICINE | Facility: CLINIC | Age: 47
End: 2022-08-10

## 2022-08-10 ENCOUNTER — TELEPHONE (OUTPATIENT)
Dept: GASTROENTEROLOGY | Facility: CLINIC | Age: 47
End: 2022-08-10

## 2022-08-10 DIAGNOSIS — R11.0 NAUSEA: ICD-10-CM

## 2022-08-10 DIAGNOSIS — K31.89 GASTRIC WALL THICKENING: ICD-10-CM

## 2022-08-10 DIAGNOSIS — R14.0 BLOATING SYMPTOM: Primary | ICD-10-CM

## 2022-08-10 DIAGNOSIS — R10.31 RLQ ABDOMINAL PAIN: ICD-10-CM

## 2022-08-10 PROCEDURE — 74177 CT ABD & PELVIS W/CONTRAST: CPT | Performed by: RADIOLOGY

## 2022-08-10 RX ORDER — IOPAMIDOL 755 MG/ML
72 INJECTION, SOLUTION INTRAVASCULAR ONCE
Status: DISCONTINUED | OUTPATIENT
Start: 2022-08-10 | End: 2022-08-10 | Stop reason: CLARIF

## 2022-08-10 RX ORDER — IOPAMIDOL 755 MG/ML
72 INJECTION, SOLUTION INTRAVASCULAR ONCE
Status: COMPLETED | OUTPATIENT
Start: 2022-08-10 | End: 2022-08-10

## 2022-08-10 RX ADMIN — IOPAMIDOL 72 ML: 755 INJECTION, SOLUTION INTRAVASCULAR at 07:28

## 2022-08-10 NOTE — TELEPHONE ENCOUNTER
Patient having colo on 10/26 with Leventhal.  Has some nonspecific thickening in gastric antrum on CT and vague GI symptoms.  Can we add EGD at same time?    Thanks,  Adelaide Correa MD  Internal Medicine

## 2022-08-10 NOTE — TELEPHONE ENCOUNTER
Caller: Marli    Procedure: Colon    Date, Location, and Surgeon of Procedure Cancelled: 10/26/22 [facility]    Ordering Provider:Sunny    Reason for cancel (please be detailed, any staff messages or encounters to note?): When I called patient to add on EGD on 10/26/22, she informed me that she will be out of town and needs to reschedule    Leventhal, Thomas Michael, MD  Endoscopy Nurse Pool; Adelaide Correa MD 1 hour ago (1:29 PM)     TL  Scheduling   - Please add on EGD to colonoscopy time slot and make necessary adjustments with subsequent patients.  Thank you     TL              Rescheduled: Yes     If rescheduled:    Date: 11/16/22   [place] [facility]   Prep Resent: No Changes (changes to prep?)   Covid Test Rescheduled: No   Note any change or update to original order/sedation: Added EGD to colon and rescheduled both procedures per patient request.  Scheduled with MAC sedation because that was what was available on the date requested by patient

## 2022-09-11 ENCOUNTER — HEALTH MAINTENANCE LETTER (OUTPATIENT)
Age: 47
End: 2022-09-11

## 2022-10-21 ENCOUNTER — ANCILLARY PROCEDURE (OUTPATIENT)
Dept: MAMMOGRAPHY | Facility: CLINIC | Age: 47
End: 2022-10-21
Attending: INTERNAL MEDICINE
Payer: COMMERCIAL

## 2022-10-21 DIAGNOSIS — Z12.31 ENCOUNTER FOR SCREENING MAMMOGRAM FOR BREAST CANCER: ICD-10-CM

## 2022-10-21 PROCEDURE — 77063 BREAST TOMOSYNTHESIS BI: CPT | Mod: GC | Performed by: RADIOLOGY

## 2022-10-21 PROCEDURE — 77067 SCR MAMMO BI INCL CAD: CPT | Mod: GC | Performed by: RADIOLOGY

## 2022-11-10 ENCOUNTER — TELEPHONE (OUTPATIENT)
Dept: GASTROENTEROLOGY | Facility: CLINIC | Age: 47
End: 2022-11-10

## 2022-11-10 DIAGNOSIS — R10.31 RLQ ABDOMINAL PAIN: Primary | ICD-10-CM

## 2022-11-10 RX ORDER — BISACODYL 5 MG/1
TABLET, DELAYED RELEASE ORAL
Qty: 4 TABLET | Refills: 0 | Status: SHIPPED | OUTPATIENT
Start: 2022-11-10 | End: 2024-07-12

## 2022-11-10 NOTE — TELEPHONE ENCOUNTER
----- Message from Marli Evangelista RN sent at 11/10/2022 11:54 AM CST -----  Regarding: cancel  Hello,    This patient needs to cancel her 11.16.22 colonoscopy due to travel, states will call back at a later date to reschedule,     Thank you,    Marli

## 2023-06-03 ENCOUNTER — HEALTH MAINTENANCE LETTER (OUTPATIENT)
Age: 48
End: 2023-06-03

## 2023-10-07 ENCOUNTER — HEALTH MAINTENANCE LETTER (OUTPATIENT)
Age: 48
End: 2023-10-07

## 2023-10-25 ENCOUNTER — OFFICE VISIT (OUTPATIENT)
Dept: DERMATOLOGY | Facility: CLINIC | Age: 48
End: 2023-10-25
Payer: COMMERCIAL

## 2023-10-25 ENCOUNTER — ANCILLARY PROCEDURE (OUTPATIENT)
Dept: MAMMOGRAPHY | Facility: CLINIC | Age: 48
End: 2023-10-25
Attending: INTERNAL MEDICINE
Payer: COMMERCIAL

## 2023-10-25 DIAGNOSIS — L82.1 SK (SEBORRHEIC KERATOSIS): Primary | ICD-10-CM

## 2023-10-25 DIAGNOSIS — D22.9 MULTIPLE NEVI: ICD-10-CM

## 2023-10-25 DIAGNOSIS — Z85.828 HISTORY OF SKIN CANCER: ICD-10-CM

## 2023-10-25 DIAGNOSIS — Z12.31 VISIT FOR SCREENING MAMMOGRAM: ICD-10-CM

## 2023-10-25 PROCEDURE — 77063 BREAST TOMOSYNTHESIS BI: CPT | Performed by: RADIOLOGY

## 2023-10-25 PROCEDURE — 77067 SCR MAMMO BI INCL CAD: CPT | Performed by: RADIOLOGY

## 2023-10-25 PROCEDURE — 99203 OFFICE O/P NEW LOW 30 MIN: CPT | Performed by: DERMATOLOGY

## 2023-10-25 ASSESSMENT — PAIN SCALES - GENERAL: PAINLEVEL: NO PAIN (0)

## 2023-10-25 NOTE — PROGRESS NOTES
[facility] Dermatology Note  Encounter Date: Oct 25, 2023  Office Visit     Dermatology Problem List:    1. History of NMSC:  - Recurrent BCC, L upper chest, s/p Mohs   - BCC, L upper chest, shave removal in Spring 2016  2. Multiple melanocytic nevi      ____________________________________________    Assessment & Plan:     # History of recurrent BCC left chest- NERD    # Nevi- signature nevus is dark brown macule with even reticular pattern on dermoscopy.  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  No atypical vessels on dermoscopy.  Will monitor    # Seborrheic keratosis, cherry angiomas, lentigo  - discussed the warning signs for skin cancer          Follow-up: 1 year(s) in-person, or earlier for new or changing lesions    Staff:     Ciara Díaz MD  ____________________________________________    CC: Derm Problem (FBSE- no spots of concern.)    HPI:  Ms. Marli Cherry is a(n) 48 year old female who presents today as a return patient for a skin check.  History of BCC recurrent on left chest removed second time with shave biopsy.  Dr Valentine recommended monitoring.  No tender or bleeding lesions per patient.  Notes a scaly area of the left breast and a spot on the right shin with redness that comes and goes    Patient is otherwise feeling well, without additional skin concerns.     Labs Reviewed:  N/A    Physical Exam:  Vitals: There were no vitals taken for this visit.  SKIN: Total skin excluding the undergarment areas was performed. The exam included the head/face, neck, both arms, chest, back, abdomen, both legs, digits and/or nails.   - waxy stuck on papules including area of concern on the left breast  -scar left chest with no evidence of recurrence  - diffuse lentigo  - Nevi- signature nevus is dark brown macule with even reticular pattern on dermoscopy.  None with atypia  -cherry angiomas  - right anterior tibia  with hypopigmented waxy patch with mild erythema.  Not tender or friable.  No atypia on dermoscopy   - No other lesions of concern on areas examined.     Medications:  Current Outpatient Medications   Medication    fluticasone (FLONASE) 50 MCG/ACT nasal spray    ibuprofen (ADVIL/MOTRIN) 200 MG tablet    loratadine (CLARITIN) 10 MG tablet    bisacodyl (DULCOLAX) 5 MG EC tablet    polyethylene glycol (GOLYTELY) 236 g suspension     No current facility-administered medications for this visit.      Past Medical History:   There is no problem list on file for this patient.    Past Medical History:   Diagnosis Date    Acute tension headache     Infertility, female     Skin cancer of chest, excluding breast     basal cell L chest       CC Referred Self, MD  No address on file on close of this encounter.

## 2023-10-25 NOTE — NURSING NOTE
Dermatology Rooming Note    Marli Cherry's goals for this visit include:   Chief Complaint   Patient presents with    Derm Problem     FBSE- no spots of concern.     Rita Fall, EMT

## 2023-10-25 NOTE — LETTER
10/25/2023       RE: Marli Cherry  [address] [facility] [address]     Dear Colleague,    Thank you for referring your patient, Marli Cherry, to the [facility] [place] at [facility]. Please see a copy of my visit note below.    [facility] Dermatology Note  Encounter Date: Oct 25, 2023  Office Visit     Dermatology Problem List:    1. History of NMSC:  - Recurrent BCC, L upper chest, s/p Mohs   - BCC, L upper chest, shave removal in Spring 2016  2. Multiple melanocytic nevi      ____________________________________________    Assessment & Plan:     # History of recurrent BCC left chest- NERD    # Nevi- signature nevus is dark brown macule with even reticular pattern on dermoscopy.  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  No atypical vessels on dermoscopy.  Will monitor    # Seborrheic keratosis, cherry angiomas, lentigo  - discussed the warning signs for skin cancer          Follow-up: 1 year(s) in-person, or earlier for new or changing lesions    Staff:     Ciara Díaz MD  ____________________________________________    CC: Derm Problem (FBSE- no spots of concern.)    HPI:  Ms. Marli Cherry is a(n) 48 year old female who presents today as a return patient for a skin check.  History of BCC recurrent on left chest removed second time with shave biopsy.  Dr Valentine recommended monitoring.  No tender or bleeding lesions per patient.  Notes a scaly area of the left breast and a spot on the right shin with redness that comes and goes    Patient is otherwise feeling well, without additional skin concerns.     Labs Reviewed:  N/A    Physical Exam:  Vitals: There were no vitals taken for this visit.  SKIN: Total skin excluding the undergarment areas was performed. The exam included the head/face, neck, both arms, chest, back, abdomen, both legs,  digits and/or nails.   - waxy stuck on papules including area of concern on the left breast  -scar left chest with no evidence of recurrence  - diffuse lentigo  - Nevi- signature nevus is dark brown macule with even reticular pattern on dermoscopy.  None with atypia  -cherry angiomas  - right anterior tibia with hypopigmented waxy patch with mild erythema.  Not tender or friable.  No atypia on dermoscopy   - No other lesions of concern on areas examined.     Medications:  Current Outpatient Medications   Medication    fluticasone (FLONASE) 50 MCG/ACT nasal spray    ibuprofen (ADVIL/MOTRIN) 200 MG tablet    loratadine (CLARITIN) 10 MG tablet    bisacodyl (DULCOLAX) 5 MG EC tablet    polyethylene glycol (GOLYTELY) 236 g suspension     No current facility-administered medications for this visit.      Past Medical History:   There is no problem list on file for this patient.    Past Medical History:   Diagnosis Date    Acute tension headache     Infertility, female     Skin cancer of chest, excluding breast     basal cell L chest       CC Referred Self, MD  No address on file on close of this encounter.

## 2024-07-09 SDOH — HEALTH STABILITY: PHYSICAL HEALTH: ON AVERAGE, HOW MANY DAYS PER WEEK DO YOU ENGAGE IN MODERATE TO STRENUOUS EXERCISE (LIKE A BRISK WALK)?: 3 DAYS

## 2024-07-09 SDOH — HEALTH STABILITY: PHYSICAL HEALTH: ON AVERAGE, HOW MANY MINUTES DO YOU ENGAGE IN EXERCISE AT THIS LEVEL?: 30 MIN

## 2024-07-09 ASSESSMENT — SOCIAL DETERMINANTS OF HEALTH (SDOH): HOW OFTEN DO YOU GET TOGETHER WITH FRIENDS OR RELATIVES?: ONCE A WEEK

## 2024-07-12 ENCOUNTER — PATIENT OUTREACH (OUTPATIENT)
Dept: ONCOLOGY | Facility: CLINIC | Age: 49
End: 2024-07-12

## 2024-07-12 ENCOUNTER — OFFICE VISIT (OUTPATIENT)
Dept: INTERNAL MEDICINE | Facility: CLINIC | Age: 49
End: 2024-07-12
Payer: COMMERCIAL

## 2024-07-12 VITALS
HEART RATE: 70 BPM | BODY MASS INDEX: 21.27 KG/M2 | WEIGHT: 115.6 LBS | OXYGEN SATURATION: 100 % | HEIGHT: 62 IN | DIASTOLIC BLOOD PRESSURE: 85 MMHG | SYSTOLIC BLOOD PRESSURE: 154 MMHG

## 2024-07-12 DIAGNOSIS — Z00.00 ROUTINE GENERAL MEDICAL EXAMINATION AT A HEALTH CARE FACILITY: ICD-10-CM

## 2024-07-12 DIAGNOSIS — Z11.59 NEED FOR HEPATITIS C SCREENING TEST: ICD-10-CM

## 2024-07-12 DIAGNOSIS — Z13.220 SCREENING FOR HYPERLIPIDEMIA: ICD-10-CM

## 2024-07-12 DIAGNOSIS — Z80.3 FAMILY HISTORY OF MALIGNANT NEOPLASM OF BREAST: ICD-10-CM

## 2024-07-12 DIAGNOSIS — Z12.39 BREAST CANCER SCREENING, HIGH RISK PATIENT: ICD-10-CM

## 2024-07-12 DIAGNOSIS — Z12.11 SCREEN FOR COLON CANCER: Primary | ICD-10-CM

## 2024-07-12 DIAGNOSIS — R53.83 OTHER FATIGUE: ICD-10-CM

## 2024-07-12 DIAGNOSIS — Z11.4 SCREENING FOR HIV (HUMAN IMMUNODEFICIENCY VIRUS): ICD-10-CM

## 2024-07-12 PROCEDURE — 99396 PREV VISIT EST AGE 40-64: CPT | Performed by: INTERNAL MEDICINE

## 2024-07-12 NOTE — PROGRESS NOTES
Preventive Care Visit  [facility]  Adelaide Correa MD, Internal Medicine  Jul 12, 2024      Assessment & Plan     Screen for colon cancer  - Colonoscopy Screening  Referral; Future    Screening for HIV (human immunodeficiency virus)  - HIV Screening; Future    Need for hepatitis C screening test  - Hepatitis C Screen Reflex to HCV RNA Quant and Genotype; Future    Breast cancer screening, high risk patient  - MR Breast Bilateral w/o & w Contrast; Future    Family history of malignant neoplasm of breast  - Adult Oncology/Hematology  Referral; Future  - MR Breast Bilateral w/o & w Contrast; Future    Routine general medical examination at a health care facility  - REVIEW OF HEALTH MAINTENANCE PROTOCOL ORDERS    Screening for hyperlipidemia  - Lipid panel reflex to direct LDL Fasting; Future  - Comprehensive metabolic panel (BMP + Alb, Alk Phos, ALT, AST, Total. Bili, TP); Future    Other fatigue  - TSH with free T4 reflex; Future  - CBC with platelets; Future            Counseling  The patient reports drinking more than 3 alcoholic drinks per day and/or more than 7 drhnks per week. The patient was counseled and given information about possible harmful effects of excessive alcohol intake.        No follow-ups on file.    Geovany Calles is a 49 year old, presenting for the following:  Physical        7/12/2024     7:41 AM   Additional Questions   Roomed by SK EMT          HPI    Going to [place] soon, work busy  Stopped jogging, a bit less active    Generally well, no concerns about health  Bowel/pelvic symptoms are no longer bothering her  Still getting cycles but irregular.  No hot flashes/night sweats.  Last fall we did CT and showed non specific gastritis, diverticulosis.  Wondering about family history and personal  history of breast cancer.    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       3 rounds IVF--unsuccessful  Last pap 9/15  No abnormal paps  Abnormal menses every 23-41  No abnormal mammos      Routine Health Maintenance  Lipids:   Recent Labs   Lab Test 22  0849 19  0658   CHOL 192 178   HDL 63 70   * 77   TRIG 116 158*     AAA Screening (65-75 yrs): n/a  Lung Ca Screening (>30 pk age 55-79 or >20 py age 50-79 + RF): n/a  Colonoscopy (50-75 yrs): discussed and advised  Dexa (>65W or 70M yrs): n/a  Mammogram (40-75 yrs): 10/21, ordered MRI given ROSEMARY risk 27%  Pap (21-65 yrs): HPV neg   Lab Results   Component Value Date    PAP NIL 2020     Safety/Lifestyle: Reviewed  Tob/EtOH: Reviewed   PHQ-2 Score:   PHQ-2 Score:         2024    10:22 AM 2022     7:43 AM   PHQ-2 (  [employer])   Q1: Little interest or pleasure in doing things 0 0   Q2: Feeling down, depressed or hopeless 0 0   PHQ-2 Score 0 0   Q1: Little interest or pleasure in doing things Not at all    Q2: Feeling down, depressed or hopeless Not at all    PHQ-2 Score 0          Advanced Directive: Not discussed                2024   General Health   How would you rate your overall physical health? Good   Feel stress (tense, anxious, or unable to sleep) Only a little      (!) STRESS CONCERN      2024   Nutrition   Three or more servings of calcium each day? Yes   Diet: Regular (no restrictions)   How many servings of fruit and vegetables per day? (!) 2-3   How many sweetened beverages each day? 0-1            2024   Exercise   Days per week of moderate/strenous exercise 3 days   Average minutes spent exercising at this level 30 min            2024   Social Factors   Frequency of gathering with friends or relatives Once a week   Worry food won't last until get money to buy more No   Food not last or not have enough money for food? No   Do you have housing? (Housing is defined as stable permanent housing and does not  include staying ouside in a car, in a tent, in an abandoned building, in an overnight shelter, or couch-surfing.) Yes   Are you worried about losing your housing? No   Lack of transportation? No   Unable to get utilities (heat,electricity)? No            7/9/2024   Fall Risk   Fallen 2 or more times in the past year? No   Trouble with walking or balance? No             7/9/2024   Dental   Dentist two times every year? Yes            7/9/2024   TB Screening   Were you born outside of the [place]? No            Today's PHQ-2 Score:       7/11/2024    10:22 AM   PHQ-2 ( 1999 [employer])   Q1: Little interest or pleasure in doing things 0   Q2: Feeling down, depressed or hopeless 0   PHQ-2 Score 0   Q1: Little interest or pleasure in doing things Not at all   Q2: Feeling down, depressed or hopeless Not at all   PHQ-2 Score 0           7/9/2024   Substance Use   Alcohol more than 3/day or more than 7/wk Yes   How often do you have a drink containing alcohol 4 or more times a week   How many alcohol drinks on typical day 1 or 2   How often do you have 5+ drinks at one occasion Never   Audit 2/3 Score 0   How often not able to stop drinking once started Never   How often failed to do what normally expected Never   How often needed first drink in am after a heavy drinking session Never   How often feeling of guilt or remorse after drinking Never   How often unable to remember what happened the night before Never   Have you or someone else been injured because of your drinking No   Has anyone been concerned or suggested you cut down on drinking No   TOTAL SCORE - AUDIT 4   Do you use any other substances recreationally? (!) ALCOHOL        Social History     Tobacco Use    Smoking status: Never    Smokeless tobacco: Never   Substance Use Topics    Alcohol use: Yes     Alcohol/week: 3.0 standard drinks of alcohol     Types: 3 Glasses of wine per week    Drug use: Never             7/9/2024   Breast Cancer Screening   Family history of  "breast, colon, or ovarian cancer? Yes          7/9/2024   LAST FHS-7 RESULTS   1st degree relative breast or ovarian cancer Yes   Any relative bilateral breast cancer No   Any male have breast cancer No   Any ONE woman have BOTH breast AND ovarian cancer No   Any woman with breast cancer before 50yrs Yes   2 or more relatives with breast AND/OR ovarian cancer Yes   2 or more relatives with breast AND/OR bowel cancer Yes                     7/9/2024   One time HIV Screening   Previous HIV test? Yes          7/9/2024   STI Screening   New sexual partner(s) since last STI/HIV test? No              Latest Ref Rng & Units 8/20/2020    10:00 AM 8/20/2020     9:47 AM   PAP / HPV   PAP (Historical)   NIL    HPV 16 DNA NEG^Negative Negative     HPV 18 DNA NEG^Negative Negative     Other HR HPV NEG^Negative Negative       ASCVD Risk   The 10-year ASCVD risk score (Deepika HENRIQUEZ, et al., 2019) is: 1.6%    Values used to calculate the score:      Age: 49 years      Sex: Female      Is Non- : No      Diabetic: No      Tobacco smoker: No      Systolic Blood Pressure: 154 mmHg      Is BP treated: No      HDL Cholesterol: 63 mg/dL      Total Cholesterol: 192 mg/dL           Reviewed and updated as needed this visit by Provider                             Objective    Exam  BP (!) 154/85 (BP Location: Right arm, Patient Position: Sitting, Cuff Size: Adult Regular)   Pulse 70   Ht 1.578 m (5' 2.13\")   Wt 52.4 kg (115 lb 9.6 oz)   LMP 04/26/2018   SpO2 100%   BMI 21.06 kg/m     Estimated body mass index is 21.06 kg/m  as calculated from the following:    Height as of this encounter: 1.578 m (5' 2.13\").    Weight as of this encounter: 52.4 kg (115 lb 9.6 oz).    Physical Exam  Constitutional: Alert, oriented, pleasant, no acute distress  Head: Normocephalic, atraumatic  Eyes: Extra-ocular movements intact, no scleral icterus  ENT: Oropharynx clear, moist mucus membranes  Neck: Supple, no " lymphadenopathy  Cardiovascular: Regular rate and rhythm, no murmurs, rubs or gallops, peripheral pulses full/symmetric  Respiratory: Good air movement bilaterally, lungs clear, no wheezes/rales/rhonchi  GI: Abdomen soft, bowel sounds present, nondistended, nontender, no organomegaly or masses, no rebound/guarding  Musculoskeletal: No edema, normal muscle tone, normal gait  Neurologic: Alert and oriented, cranial nerves 2-12 intact, no focal deficits  Skin: No rashes/lesions  Psychiatric: normal mentation, affect and mood          Signed Electronically by: Adelaide Correa MD

## 2024-07-12 NOTE — PATIENT INSTRUCTIONS
Try checking your blood pressure at home.  Can do it serially, three times in a row, to overcome measurement anxiety. Can check 1-2 times weekly for a month or so.  Check while you are feeling relaxed, not after heavy exertion or stress.  If consistently over 130/80, please let me know.    Please schedule:  Colonoscopy -- they will call you  Genetic counseling -- they should call you  Labs and breast MRI -- see number below  [facility] scheduling information: [phone] (Imaging); [phone] (Lab); [phone] (infusion center).

## 2024-07-12 NOTE — PROGRESS NOTES
Darlynr received Cancer Risk Management Program referral, referred for:    Family history of malignant neoplasm of breast.    Referred By    Provider Department Location Phone   Adelaide Correa MD [facility] [phone]        Reviewed for appropriate plan, and sent to New Patient Scheduling for completion.

## 2024-07-19 ENCOUNTER — LAB (OUTPATIENT)
Dept: LAB | Facility: CLINIC | Age: 49
End: 2024-07-19
Payer: COMMERCIAL

## 2024-07-19 DIAGNOSIS — Z11.59 NEED FOR HEPATITIS C SCREENING TEST: ICD-10-CM

## 2024-07-19 DIAGNOSIS — Z11.4 SCREENING FOR HIV (HUMAN IMMUNODEFICIENCY VIRUS): ICD-10-CM

## 2024-07-19 DIAGNOSIS — Z13.220 SCREENING FOR HYPERLIPIDEMIA: ICD-10-CM

## 2024-07-19 DIAGNOSIS — R53.83 OTHER FATIGUE: ICD-10-CM

## 2024-07-19 LAB
ALBUMIN SERPL BCG-MCNC: 4.6 G/DL (ref 3.5–5.2)
ALP SERPL-CCNC: 52 U/L (ref 40–150)
ALT SERPL W P-5'-P-CCNC: 13 U/L (ref 0–50)
ANION GAP SERPL CALCULATED.3IONS-SCNC: 11 MMOL/L (ref 7–15)
AST SERPL W P-5'-P-CCNC: 16 U/L (ref 0–45)
BILIRUB SERPL-MCNC: 0.5 MG/DL
BUN SERPL-MCNC: 9.2 MG/DL (ref 6–20)
CALCIUM SERPL-MCNC: 9.7 MG/DL (ref 8.8–10.4)
CHLORIDE SERPL-SCNC: 99 MMOL/L (ref 98–107)
CHOLEST SERPL-MCNC: 192 MG/DL
CREAT SERPL-MCNC: 0.75 MG/DL (ref 0.51–0.95)
EGFRCR SERPLBLD CKD-EPI 2021: >90 ML/MIN/1.73M2
ERYTHROCYTE [DISTWIDTH] IN BLOOD BY AUTOMATED COUNT: 13.2 % (ref 10–15)
FASTING STATUS PATIENT QL REPORTED: YES
FASTING STATUS PATIENT QL REPORTED: YES
GLUCOSE SERPL-MCNC: 102 MG/DL (ref 70–99)
HCO3 SERPL-SCNC: 25 MMOL/L (ref 22–29)
HCT VFR BLD AUTO: 39.3 % (ref 35–47)
HCV AB SERPL QL IA: NONREACTIVE
HDLC SERPL-MCNC: 71 MG/DL
HGB BLD-MCNC: 13.1 G/DL (ref 11.7–15.7)
HIV 1+2 AB+HIV1 P24 AG SERPL QL IA: NONREACTIVE
LDLC SERPL CALC-MCNC: 99 MG/DL
MCH RBC QN AUTO: 30 PG (ref 26.5–33)
MCHC RBC AUTO-ENTMCNC: 33.3 G/DL (ref 31.5–36.5)
MCV RBC AUTO: 90 FL (ref 78–100)
NONHDLC SERPL-MCNC: 121 MG/DL
PLATELET # BLD AUTO: 287 10E3/UL (ref 150–450)
POTASSIUM SERPL-SCNC: 4.5 MMOL/L (ref 3.4–5.3)
PROT SERPL-MCNC: 7.2 G/DL (ref 6.4–8.3)
RBC # BLD AUTO: 4.36 10E6/UL (ref 3.8–5.2)
SODIUM SERPL-SCNC: 135 MMOL/L (ref 135–145)
TRIGL SERPL-MCNC: 112 MG/DL
TSH SERPL DL<=0.005 MIU/L-ACNC: 1.55 UIU/ML (ref 0.3–4.2)
WBC # BLD AUTO: 4.1 10E3/UL (ref 4–11)

## 2024-07-19 PROCEDURE — 80061 LIPID PANEL: CPT | Performed by: PATHOLOGY

## 2024-07-19 PROCEDURE — 80053 COMPREHEN METABOLIC PANEL: CPT | Performed by: PATHOLOGY

## 2024-07-19 PROCEDURE — 84443 ASSAY THYROID STIM HORMONE: CPT | Performed by: PATHOLOGY

## 2024-07-19 PROCEDURE — 87389 HIV-1 AG W/HIV-1&-2 AB AG IA: CPT | Performed by: INTERNAL MEDICINE

## 2024-07-19 PROCEDURE — 36415 COLL VENOUS BLD VENIPUNCTURE: CPT | Performed by: PATHOLOGY

## 2024-07-19 PROCEDURE — 85027 COMPLETE CBC AUTOMATED: CPT | Performed by: PATHOLOGY

## 2024-07-19 PROCEDURE — 99000 SPECIMEN HANDLING OFFICE-LAB: CPT | Performed by: PATHOLOGY

## 2024-07-19 PROCEDURE — 86803 HEPATITIS C AB TEST: CPT | Performed by: INTERNAL MEDICINE

## 2024-10-29 ENCOUNTER — OFFICE VISIT (OUTPATIENT)
Dept: DERMATOLOGY | Facility: CLINIC | Age: 49
End: 2024-10-29
Attending: DERMATOLOGY
Payer: COMMERCIAL

## 2024-10-29 DIAGNOSIS — D48.5 NEOPLASM OF UNCERTAIN BEHAVIOR OF SKIN: ICD-10-CM

## 2024-10-29 DIAGNOSIS — D22.9 MULTIPLE NEVI: ICD-10-CM

## 2024-10-29 DIAGNOSIS — L57.0 AK (ACTINIC KERATOSIS): ICD-10-CM

## 2024-10-29 DIAGNOSIS — Z85.828 HISTORY OF SKIN CANCER: ICD-10-CM

## 2024-10-29 DIAGNOSIS — L82.1 SK (SEBORRHEIC KERATOSIS): Primary | ICD-10-CM

## 2024-10-29 PROCEDURE — 99213 OFFICE O/P EST LOW 20 MIN: CPT | Performed by: DERMATOLOGY

## 2024-10-29 ASSESSMENT — PAIN SCALES - GENERAL: PAINLEVEL_OUTOF10: NO PAIN (0)

## 2024-10-29 NOTE — LETTER
10/29/2024       RE: Marli Cherry  [address] [facility] [address]     Dear Colleague,    Thank you for referring your patient, Marli Cherry, to the [facility] [place] at [facility]. Please see a copy of my visit note below.    [facility] Dermatology Note  Encounter Date: Oct 29, 2024  Office Visit     Dermatology Problem List:  1. History of NMSC:  - Recurrent BCC, L upper chest, s/p Mohs   - BCC, L upper chest, shave removal in Spring 2016  2. Multiple melanocytic nevi    ____________________________________________    Assessment & Plan:     #  NUB- Pearly papule of left frontal scalp, suspect BCC.   - Shave biopsy deferred per patient due to a future time due to patient work meeting.    # Actinic keratosis, few on face.    - To defer treatment with LN2 to a future time due to patient work meeting.    # benign skin findings: lentigo, cherry angiomas, telangiectasias, nevi, seborrheic keratoses, dermatofibroma of right leg    # History of skin cancer- NERD    Procedures Performed:   None.    Follow-up: With providers as soon as possible for biopsy for front left scalp lesion.    Staff and Medical Student:  Student Doctor Lucero Hamilton  I was present with the medical student who participated in the service and in the documentation of the note. I have verified the history and personally performed the physical exam and medical decision making. I agree with the assessment and plan of care as documented in the note.  Ciara Díaz MD     ____________________________________________    CC: Skin Check (FBSE- New spot on scalp. )    HPI:  Ms. Marli Cherry is a(n) 49 year old female who presents today as a return patient for a papule that has been growing over the past 6 months. Pt noticed it over 6 months ago as a dry spot but has since become a large papule. Pt denies ever traumatizing the area and reports no pain from  that lesion.    Patient is otherwise feeling well, without additional skin concerns.     Labs Reviewed:  N/A    Physical Exam:  Vitals: There were no vitals taken for this visit.  SKIN: Full skin, which includes the head/face, both arms, chest, back, abdomen,both legs, genitalia and/or groin buttocks, digits and/or nails, was examined.  - Pink pearly papule with overlying telangiectasia, located on the frontal left scalp.  - Multiple melanocytic nevi with dark reticular pattern on back, chest, upper extremities.  - There is a firm tan/flesh colored papule that dimples with lateral pressure on the right upper extremity.   - There are fine lines and dyspigmentation on sun exposed areas of the face and chest.  - No other lesions of concern on areas examined.     Medications:  Current Outpatient Medications   Medication Sig Dispense Refill     fluticasone (FLONASE) 50 MCG/ACT nasal spray Spray 2 sprays into both nostrils daily 16 g 0     ibuprofen (ADVIL/MOTRIN) 200 MG tablet Take 200 mg by mouth as needed for headaches       loratadine (CLARITIN) 10 MG tablet Take 1 tablet (10 mg) by mouth daily 30 tablet 0     No current facility-administered medications for this visit.      Past Medical History:   There is no problem list on file for this patient.    Past Medical History:   Diagnosis Date     Acute tension headache      Infertility, female      Skin cancer of chest, excluding breast     basal cell L chest       CC Ciara Díaz MD  [address]  [place] [address] on close of this encounter.      Again, thank you for allowing me to participate in the care of your patient.      Sincerely,    Ciara Díaz MD

## 2024-10-29 NOTE — NURSING NOTE
Dermatology Rooming Note    Marli Cherry's goals for this visit include:   Chief Complaint   Patient presents with    Skin Check     FBSE- New spot on scalp.      Elian Alvarado, EMT  Clinic Support  [facility]     [phone]    Employed by [facility] [employer]

## 2024-11-08 ENCOUNTER — OFFICE VISIT (OUTPATIENT)
Dept: DERMATOLOGY | Facility: CLINIC | Age: 49
End: 2024-11-08
Payer: COMMERCIAL

## 2024-11-08 DIAGNOSIS — D49.2 NEOPLASM OF UNSPECIFIED BEHAVIOR OF BONE, SOFT TISSUE, AND SKIN: Primary | ICD-10-CM

## 2024-11-08 PROCEDURE — 11102 TANGNTL BX SKIN SINGLE LES: CPT | Performed by: PHYSICIAN ASSISTANT

## 2024-11-08 PROCEDURE — 88305 TISSUE EXAM BY PATHOLOGIST: CPT | Mod: 26 | Performed by: STUDENT IN AN ORGANIZED HEALTH CARE EDUCATION/TRAINING PROGRAM

## 2024-11-08 PROCEDURE — 88305 TISSUE EXAM BY PATHOLOGIST: CPT | Mod: TC | Performed by: PHYSICIAN ASSISTANT

## 2024-11-08 ASSESSMENT — PAIN SCALES - GENERAL: PAINLEVEL_OUTOF10: NO PAIN (0)

## 2024-11-08 NOTE — PROGRESS NOTES
Lidocaine-epinephrine 1-1:847075 % injection   0.6mL once for one use, starting 11/8/2024 ending 11/8/2024,  2mL disp, R-0, injection  Injected by CARLOS Steen

## 2024-11-08 NOTE — NURSING NOTE
Dermatology Rooming Note    Marli Cherry's goals for this visit include:   Chief Complaint   Patient presents with    Derm Problem     Biopsy- front L scalp lesion     CARLOS Steen

## 2024-11-08 NOTE — PROGRESS NOTES
[facility] Dermatology Note  Encounter Date: Nov 8, 2024  Office Visit     Reviewed patients past medical history and pertinent chart review prior to patients visit today.     Dermatology Problem List:  1. History of NMSC:  - Recurrent BCC, L upper chest, s/p Mohs   - BCC, L upper chest, shave removal in Spring 2016  2. Multiple melanocytic nevi       ____________________________________________    Assessment & Plan:     # Neoplasm of uncertain behavior:  left frontal scalp  DDx includes BCC vs other. Shave biopsy today.    Procedure Note: Biopsy by shave technique  The risks and benefits of the procedure were described to the patient. These include but are not limited to bleeding, infection, scar, incomplete removal, and non-diagnostic biopsy. Verbal informed consent was obtained. The above site(s) was cleansed with an alcohol pad and injected with 1% lidocaine with epinephrine. Once anesthesia was obtained, a biopsy(ies) was performed with Gilette blade. The tissue(s) was placed in a labeled container(s) with formalin and sent to pathology. Hemostasis was achieved with aluminum chloride. Vaseline and a bandage were applied to the wound(s). The patient tolerated the procedure well and was given post biopsy care instructions.    All risks, benefits and alternatives were discussed with patient.  Patient is in agreement and understands the assessment and plan.  All questions were answered.  Alina Patel PA-C  [facility] Dermatology  _______________________________________    CC: Derm Problem (Biopsy- front L scalp lesion)    HPI:  Ms. Marli Cherry is a(n) 49 year old female who presents today as a return patient for biopsy of a lesion on the scalp. The patient deferred biopsy during visit on 10/29/2024. Patient is otherwise feeling well, without additional skin concerns.      Physical Exam:  SKIN: Focused examination of left fontal scalp was performed.  - The left frontal scalp  demonstrates a shiny pink papule with arborizing telangiectasias on dermoscopy.     - No other lesions of concern on areas examined.     Medications:  Current Outpatient Medications   Medication Sig Dispense Refill    fluticasone (FLONASE) 50 MCG/ACT nasal spray Spray 2 sprays into both nostrils daily 16 g 0    ibuprofen (ADVIL/MOTRIN) 200 MG tablet Take 200 mg by mouth as needed for headaches      loratadine (CLARITIN) 10 MG tablet Take 1 tablet (10 mg) by mouth daily 30 tablet 0     No current facility-administered medications for this visit.      Past Medical History:   There is no problem list on file for this patient.    Past Medical History:   Diagnosis Date    Acute tension headache     Infertility, female     Skin cancer of chest, excluding breast     basal cell L chest       CC No referring provider defined for this encounter. on close of this encounter.

## 2024-11-08 NOTE — LETTER
11/8/2024       RE: Marli Cherry  [address] [address]     Dear Colleague,    Thank you for referring your patient, Marli Cherry, to the [facility] [place] at [facility]. Please see a copy of my visit note below.    [facility] Dermatology Note  Encounter Date: Nov 8, 2024  Office Visit     Reviewed patients past medical history and pertinent chart review prior to patients visit today.     Dermatology Problem List:  1. History of NMSC:  - Recurrent BCC, L upper chest, s/p Mohs   - BCC, L upper chest, shave removal in Spring 2016  2. Multiple melanocytic nevi       ____________________________________________    Assessment & Plan:     # Neoplasm of uncertain behavior:  left frontal scalp  DDx includes BCC vs other. Shave biopsy today.    Procedure Note: Biopsy by shave technique  The risks and benefits of the procedure were described to the patient. These include but are not limited to bleeding, infection, scar, incomplete removal, and non-diagnostic biopsy. Verbal informed consent was obtained. The above site(s) was cleansed with an alcohol pad and injected with 1% lidocaine with epinephrine. Once anesthesia was obtained, a biopsy(ies) was performed with Gilette blade. The tissue(s) was placed in a labeled container(s) with formalin and sent to pathology. Hemostasis was achieved with aluminum chloride. Vaseline and a bandage were applied to the wound(s). The patient tolerated the procedure well and was given post biopsy care instructions.    All risks, benefits and alternatives were discussed with patient.  Patient is in agreement and understands the assessment and plan.  All questions were answered.  Alina Patel PA-C  [facility] Dermatology  _______________________________________    CC: Derm Problem (Biopsy- front L scalp lesion)    HPI:  Ms. Marli Cherry is a(n) 49 year old female who  presents today as a return patient for biopsy of a lesion on the scalp. The patient deferred biopsy during visit on 10/29/2024. Patient is otherwise feeling well, without additional skin concerns.      Physical Exam:  SKIN: Focused examination of left fontal scalp was performed.  - The left frontal scalp demonstrates a shiny pink papule with arborizing telangiectasias on dermoscopy.     - No other lesions of concern on areas examined.     Medications:  Current Outpatient Medications   Medication Sig Dispense Refill     fluticasone (FLONASE) 50 MCG/ACT nasal spray Spray 2 sprays into both nostrils daily 16 g 0     ibuprofen (ADVIL/MOTRIN) 200 MG tablet Take 200 mg by mouth as needed for headaches       loratadine (CLARITIN) 10 MG tablet Take 1 tablet (10 mg) by mouth daily 30 tablet 0     No current facility-administered medications for this visit.      Past Medical History:   There is no problem list on file for this patient.    Past Medical History:   Diagnosis Date     Acute tension headache      Infertility, female      Skin cancer of chest, excluding breast     basal cell L chest       CC No referring provider defined for this encounter. on close of this encounter.     Again, thank you for allowing me to participate in the care of your patient.      Sincerely,    Alina Patel PA-C

## 2024-11-11 LAB
PATH REPORT.COMMENTS IMP SPEC: ABNORMAL
PATH REPORT.COMMENTS IMP SPEC: ABNORMAL
PATH REPORT.COMMENTS IMP SPEC: YES
PATH REPORT.FINAL DX SPEC: ABNORMAL
PATH REPORT.GROSS SPEC: ABNORMAL
PATH REPORT.MICROSCOPIC SPEC OTHER STN: ABNORMAL
PATH REPORT.RELEVANT HX SPEC: ABNORMAL

## 2024-11-13 ENCOUNTER — TELEPHONE (OUTPATIENT)
Dept: DERMATOLOGY | Facility: CLINIC | Age: 49
End: 2024-11-13
Payer: COMMERCIAL

## 2024-11-13 NOTE — TELEPHONE ENCOUNTER
Called and scheduled consultation for   A. Left frontal scalp: - Basal cell carcinoma, nodular type     Waiting for consult to schedule mohs.     Socorro Pabon, Complex  11/13/2024 9:38 AM

## 2025-01-06 ENCOUNTER — VIRTUAL VISIT (OUTPATIENT)
Dept: ONCOLOGY | Facility: CLINIC | Age: 50
End: 2025-01-06
Attending: INTERNAL MEDICINE
Payer: COMMERCIAL

## 2025-01-06 DIAGNOSIS — Z80.3 FAMILY HISTORY OF MALIGNANT NEOPLASM OF BREAST: ICD-10-CM

## 2025-01-06 PROCEDURE — 96041 GENETIC COUNSELING SVC EA 30: CPT | Mod: GT,95 | Performed by: GENETIC COUNSELOR, MS

## 2025-01-06 NOTE — LETTER
1/6/2025      Marli Cherry  [address]  [place] [address]      Dear Colleague,    Thank you for referring your patient, Marli Cherry, to the [facility]. Please see a copy of my visit note below.    1/6/2025    Virtual Visit Details  Type of service:  Video Visit   Originating Location (pt. Location): Home  Distant Location (provider location):  Off-site  Platform used for Video Visit: [employer]  Length of video visit: 28 minutes    Referring Provider: Adelaide Correa MD    Presenting Information:   Today Marli elected for a virtual genetic counseling visit through the Cancer Risk Management Program to discuss her family history of cancer. We reviewed this history, cancer screening recommendations, and available genetic testing options.    Personal History:  Marli is a 49 year old female. She has had several basal cell carcinomas removed since 2016: one from her left calf in 2016, one from her clavicle in 2016 (recurred in 2018), and one from her scalp in 2024. She meets with a dermatologist annually.    She had her first menstrual period at age 11, does not have biological children, and is likely perimenopausal. Marli has her ovaries, fallopian tubes and uterus in place, and she has had no ovarian cancer screening to date. She reports that she has never used hormone replacement therapy.      She has annual mammograms and her most recent mammogram in October 2023 was normal; she is scheduled for another mammogram on 1/9/2025. Marli has not had a colonoscopy, but is planning to schedule an exam this year. She had a thyroid ultrasound in July 2019 to address a possibly enlarged glad that was normal. She does not regularly do any other cancer screening at this time.    Family History: (Please see scanned pedigree for detailed family history information)  Marli's sister is 47 and was diagnosed with atypical ductal hyperplasia (ADH) of her breast at age 44; she is currently taking a  medication. She has not pursued genetic testing, but is considering it.  Marli's mother is 71 and was diagnosed with breast cancer at age 65. She has not pursued genetic testing, but is considering it.  Her three sisters and one brother are in their 60/70's and have not had a cancer. Her other brother passed away at age 30 from AIDS complications.  Her mother (Marli's maternal grandmother) was diagnosed with breast cancer at age 70 and passed away at age 95.  Her father (Marli's maternal grandfather) passed away at age 56 and did not have a cancer.  Marli's father is 71 and has not had a cancer.  His two brothers are in their 70's and have not had a cancer.  His mother (Marli's paternal grandmother) was diagnosed with lung cancer in her 70's and passed away at age 75; she had a history of smoking.  His father (Marli's paternal grandfather) passed away at age 55 and did not have a cancer.  Her maternal ethnicity is English. Her paternal ethnicity is [place]ese. There is no known Ashkenazi Rastafari ancestry on either side of her family.    Discussion:  We discussed the features commonly associated with hereditary cancer syndromes, and a handout regarding this was provided to Marli today. This can be found in the after visit summary.  As discussed in our session, her family history is absent for the following features typically seen in high risk families:   Multiple relatives diagnosed with related cancers in several generations   Relatives diagnosed with early-onset cancers (typically under age 50)  Relatives diagnosed with rare, bilateral, and/or multiple primary cancers   Because of the absence of these features, it is unlikely that there is a currently identifiable hereditary cancer syndrome present in Marli's family. We also discussed that insurance coverage for genetic testing is dependent upon personal and family history being suggestive of a hereditary cancer syndrome.  We specifically discussed her sister's  history of ADH diagnosed under age 50. Though she was diagnosed under age 50, it is not uncommon for individuals to be diagnosed with ADH in their 40/50's. Also, ADH is considered a personal risk factor for developing breast cancer and not a breast cancer itself. Since it is more of a personal risk factor, an individual's history of ADH alone does not significantly increase their family members chance of developing breast cancer and/or a similar breast condition.  That being said, as Marli's mother and maternal grandmother were both diagnosed with breast cancer over age 50, it may be that her family history is most consistent with familial/multifactorial cancer. Familial cancer is thought to be caused by a complex combination of both shared environmental risk factors and minor genetic risk factors. Genetic testing for these minor genetic risk factors is typically not commercially available, though. Marli verbalized understanding and felt comfortable declining genetic testing today.  We discussed the importance of Marli contacting me if her personal or family history changes, though. This may modify our assessment regarding her risk for a hereditary cancer syndrome and/or genetic testing options.   Cancer screening recommendations based upon her personal/family history:  Based on her personal and family history, Marli has a 15.5% lifetime risk of developing breast cancer based on the LOR 8 model. Therefore, Marli does not meet current National Comprehensive Cancer Network (NCCN) guidelines for high risk breast screening, which is offered to women with a 20% lifetime risk or higher. However, it is still important for Marli to continue with routine breast screening under the care of her physicians.  Marli's close maternal female relatives also remain at some increased risk for breast cancer given their family history. Breast cancer screening is generally recommended to begin approximately 10 years younger than the  earliest age of breast cancer diagnosis in the family, or at age 40, whichever comes first. In this family, screening may begin at age 40. Breast screening options should be discussed with an individual's primary care provider and a Genetic Counselor, to determine at what age to begin screening, what screening is appropriate, and if additional screening (such as breast MRI) is necessary based on personal/family history factors.  Other population cancer screening options, such as those recommended by the American Cancer Society and NCCN, are also appropriate for Marli and her family. These screening recommendations may change if there are changes to Marli's personal and/or family history of cancer. Final screening recommendations should be made in consultation with each individual's primary care provider.    Plan:  1) Marli elected to not have genetic testing at this time.   2) Information regarding recommended cancer screening, based upon the family history, was reviewed for Marli.  3) Marli will contact me regularly and/or if her family or personal history changes. My contact information was provided.     I spent 55 minutes on the date of the encounter doing chart review, history and exam, documentation and further activities as noted above.    Nikia Bledsoe MS, [facility]  Licensed, Certified Genetic Counselor  Office: [phone]  [email or web address]      Again, thank you for allowing me to participate in the care of your patient.        Sincerely,        Nikia Bledsoe GC    Electronically signed

## 2025-01-06 NOTE — NURSING NOTE
Current patient location: [address] [place] [id number]    Is the patient currently in the state of [place]? YES    Visit mode:VIDEO    If the visit is dropped, the patient can be reconnected by:VIDEO VISIT: Text to cell phone:   Telephone Information:   Mobile [phone]       Will anyone else be joining the visit? NO  (If patient encounters technical issues they should call [phone]    Are changes needed to the allergy or medication list? N/A    Are refills needed on medications prescribed by this physician? NO    Rooming Documentation:  Questionnaire(s) not done per department protocol    Reason for visit: Consult    Rachel COLEYF

## 2025-01-06 NOTE — PROGRESS NOTES
1/6/2025    Virtual Visit Details  Type of service:  Video Visit   Originating Location (pt. Location): Home  Distant Location (provider location):  Off-site  Platform used for Video Visit: [employer]  Length of video visit: 28 minutes    Referring Provider: Adelaide Correa MD    Presenting Information:   Today Marli elected for a virtual genetic counseling visit through the Cancer Risk Management Program to discuss her family history of cancer. We reviewed this history, cancer screening recommendations, and available genetic testing options.    Personal History:  Marli is a 49 year old female. She has had several basal cell carcinomas removed since 2016: one from her left calf in 2016, one from her clavicle in 2016 (recurred in 2018), and one from her scalp in 2024. She meets with a dermatologist annually.    She had her first menstrual period at age 11, does not have biological children, and is likely perimenopausal. Marli has her ovaries, fallopian tubes and uterus in place, and she has had no ovarian cancer screening to date. She reports that she has never used hormone replacement therapy.      She has annual mammograms and her most recent mammogram in October 2023 was normal; she is scheduled for another mammogram on 1/9/2025. Marli has not had a colonoscopy, but is planning to schedule an exam this year. She had a thyroid ultrasound in July 2019 to address a possibly enlarged glad that was normal. She does not regularly do any other cancer screening at this time.    Family History: (Please see scanned pedigree for detailed family history information)  Marli's sister is 47 and was diagnosed with atypical ductal hyperplasia (ADH) of her breast at age 44; she is currently taking a medication. She has not pursued genetic testing, but is considering it.  Marli's mother is 71 and was diagnosed with breast cancer at age 65. She has not pursued genetic testing, but is considering it.  Her three sisters and one brother are  in their 60/70's and have not had a cancer. Her other brother passed away at age 30 from AIDS complications.  Her mother (Marli's maternal grandmother) was diagnosed with breast cancer at age 70 and passed away at age 95.  Her father (Marli's maternal grandfather) passed away at age 56 and did not have a cancer.  Marli's father is 71 and has not had a cancer.  His two brothers are in their 70's and have not had a cancer.  His mother (Marli's paternal grandmother) was diagnosed with lung cancer in her 70's and passed away at age 75; she had a history of smoking.  His father (Marli's paternal grandfather) passed away at age 55 and did not have a cancer.  Her maternal ethnicity is English. Her paternal ethnicity is [place]ian. There is no known Ashkenazi Roman Catholic ancestry on either side of her family.    Discussion:  We discussed the features commonly associated with hereditary cancer syndromes, and a handout regarding this was provided to Marli today. This can be found in the after visit summary.  As discussed in our session, her family history is absent for the following features typically seen in high risk families:   Multiple relatives diagnosed with related cancers in several generations   Relatives diagnosed with early-onset cancers (typically under age 50)  Relatives diagnosed with rare, bilateral, and/or multiple primary cancers   Because of the absence of these features, it is unlikely that there is a currently identifiable hereditary cancer syndrome present in Marli's family. We also discussed that insurance coverage for genetic testing is dependent upon personal and family history being suggestive of a hereditary cancer syndrome.  We specifically discussed her sister's history of ADH diagnosed under age 50. Though she was diagnosed under age 50, it is not uncommon for individuals to be diagnosed with ADH in their 40/50's. Also, ADH is considered a personal risk factor for developing breast cancer and not a  breast cancer itself. Since it is more of a personal risk factor, an individual's history of ADH alone does not significantly increase their family members chance of developing breast cancer and/or a similar breast condition.  That being said, as Marli's mother and maternal grandmother were both diagnosed with breast cancer over age 50, it may be that her family history is most consistent with familial/multifactorial cancer. Familial cancer is thought to be caused by a complex combination of both shared environmental risk factors and minor genetic risk factors. Genetic testing for these minor genetic risk factors is typically not commercially available, though. Marli verbalized understanding and felt comfortable declining genetic testing today.  We discussed the importance of Marli contacting me if her personal or family history changes, though. This may modify our assessment regarding her risk for a hereditary cancer syndrome and/or genetic testing options.   Cancer screening recommendations based upon her personal/family history:  Based on her personal and family history, Marli has a 15.5% lifetime risk of developing breast cancer based on the LOR 8 model. Therefore, Marli does not meet current National Comprehensive Cancer Network (NCCN) guidelines for high risk breast screening, which is offered to women with a 20% lifetime risk or higher. However, it is still important for Marli to continue with routine breast screening under the care of her physicians.  Marli's close maternal female relatives also remain at some increased risk for breast cancer given their family history. Breast cancer screening is generally recommended to begin approximately 10 years younger than the earliest age of breast cancer diagnosis in the family, or at age 40, whichever comes first. In this family, screening may begin at age 40. Breast screening options should be discussed with an individual's primary care provider and a Genetic  Counselor, to determine at what age to begin screening, what screening is appropriate, and if additional screening (such as breast MRI) is necessary based on personal/family history factors.  Other population cancer screening options, such as those recommended by the American Cancer Society and NCCN, are also appropriate for Marli and her family. These screening recommendations may change if there are changes to Marli's personal and/or family history of cancer. Final screening recommendations should be made in consultation with each individual's primary care provider.    Plan:  1) Marli elected to not have genetic testing at this time.   2) Information regarding recommended cancer screening, based upon the family history, was reviewed for Marli.  3) Marli will contact me regularly and/or if her family or personal history changes. My contact information was provided.     I spent 55 minutes on the date of the encounter doing chart review, history and exam, documentation and further activities as noted above.    Nikia Bledsoe MS, [facility]  Licensed, Certified Genetic Counselor  Office: [phone]  [email or web address]

## 2025-01-06 NOTE — PATIENT INSTRUCTIONS
Assessing Cancer Risk  Cancer is a common diagnosis which impacts many families. Individuals may develop cancer due to environmental factors (such as exposures and lifestyle), aging, genetic predisposition, or a combination of these factors. The vast majority of cancer diagnoses are considered sporadic, and not primarily due to an inherited risk factor. Approximately 5-10% of cancer diagnoses are thought to be caused by inherited risk factors.       There are several features that are likely to be present in a family that has an inherited alteration in a cancer susceptibility gene. However, this may not be the case for all families that carry a cancer risk gene, such as those with small family size or limited history information.  Cancers diagnosed at a young age (often before age 50)  Individuals with more than one primary cancer  Certain rare tumors/cancers  Multiple generations of the family affected with cancer    Categories of Cancer        Cancers may be:  Sporadic: somatic (acquired) genetic errors, environmental exposures, and lifestyle contribute to cancer as we age.  Familial: clustering of cancer in a family due to a combination of multiple genetic and shared environmental factors.  Hereditary: inherited risk for cancer, typically in a single gene.      Cancer Screening and Management  Cancer risk, as well as screening and management recommendations, are based on a combination of factors. This includes both personal and family history factors. Population cancer screening options, such as those recommended by the American Cancer Society and the National Comprehensive Cancer Network (NCCN), are appropriate for many families at average risk for cancer. However, earlier and/or more frequent screening may be recommended based on personal factors (lifestyle, exposures, medications, screening results), family history of cancer, and sometimes genetic factors. These cancer risk management options should be  discussed in more detail with an individual's medical providers.    Resources  National Society of Genetic Counselors nsgc.org   American Cancer Society cancer.org     Please call us if you have any questions or concerns.   Cancer Risk Management Program (Appointments: [phone])  Osman Morrissey, MS, [facility] [phone]  Caroline Castellon, MS, [facility] [phone]  Ayleen Bernabe, MS, [facility]  [phone]  Carola Espinoza, MS, [facility]  [phone]  Hortencia Tam, MS, [facility]  [phone]  Nikia Bledsoe, MS, [facility] [phone]  Aylin Luis, MS, [facility] [phone]

## 2025-01-09 ENCOUNTER — ANCILLARY PROCEDURE (OUTPATIENT)
Dept: MAMMOGRAPHY | Facility: CLINIC | Age: 50
End: 2025-01-09
Attending: INTERNAL MEDICINE
Payer: COMMERCIAL

## 2025-01-09 DIAGNOSIS — Z12.31 VISIT FOR SCREENING MAMMOGRAM: ICD-10-CM

## 2025-01-09 PROCEDURE — 77063 BREAST TOMOSYNTHESIS BI: CPT | Performed by: STUDENT IN AN ORGANIZED HEALTH CARE EDUCATION/TRAINING PROGRAM

## 2025-01-09 PROCEDURE — 77067 SCR MAMMO BI INCL CAD: CPT | Performed by: STUDENT IN AN ORGANIZED HEALTH CARE EDUCATION/TRAINING PROGRAM

## 2025-02-20 ENCOUNTER — VIRTUAL VISIT (OUTPATIENT)
Dept: URGENT CARE | Facility: CLINIC | Age: 50
End: 2025-02-20
Payer: COMMERCIAL

## 2025-02-20 DIAGNOSIS — N39.0 ACUTE LOWER UTI: Primary | ICD-10-CM

## 2025-02-20 RX ORDER — NITROFURANTOIN 25; 75 MG/1; MG/1
100 CAPSULE ORAL 2 TIMES DAILY
Qty: 10 CAPSULE | Refills: 0 | Status: SHIPPED | OUTPATIENT
Start: 2025-02-20 | End: 2025-02-25

## 2025-02-20 NOTE — PATIENT INSTRUCTIONS
Take full course of antibiotics- Macrobid twice daily for 5 days  May use over the counter AZO pain relief or Uristat (phenazopyridine) for urinary burning but do not use for 24 hours before future urine tests.  Drink plenty of fluids. Limit caffeine and alcohol as these are bladder irritants.  May take tylenol or ibuprofen as needed for discomfort.   If you develop any vomiting, high fevers or lower back pain, these can be signs of a kidney infection and you should be seen in urgent care or in the ER.  Prevention of future infections by drinking cranberry juice, urination after intercourse and wiping from front to back after using the toilet.  Please follow up with primary care provider if symptoms return, if you're not improving, worsening or new symptoms or for any adverse reactions to medications.

## 2025-02-20 NOTE — PROGRESS NOTES
Assessment & Plan     Acute lower UTI  - nitroFURantoin macrocrystal-monohydrate (MACROBID) 100 MG capsule; Take 1 capsule (100 mg) by mouth 2 times daily for 5 days.    Took home UTI test positive for leukocyte esterase, no fever vomiting back pain. Will treat empirically with Macrobid, be seen if symptoms worsen or do not improve.     Return in about 3 days (around 2/23/2025) for visit with primary care provider if not improving.       Althea Jean PA-C  [facility]    Subjective   Marli Cherry is a 49 year old who presents for the following health issues    HPI    Symptom onset: yesterday, uncomfortable in the morning, worsened today  Current symptoms: dysuria with urination  No fever, back pain, nausea or vomiting, lower abdominal discomfort  Medication review complete.    Review of Systems   ROS negative except as stated above.      Objective    Physical Exam   GENERAL: Healthy, alert and no distress  EYES: Eyes grossly normal to inspection.  No discharge or erythema, or obvious scleral/conjunctival abnormalities.  HENT: Normal cephalic/atraumatic.  External ears, nose and mouth without ulcers or lesions.  No nasal drainage visible.  RESP: No audible cough wheeze or visible cyanosis.  No visible retractions or increased work of breathing.    SKIN: Visible skin clear. No significant rash, abnormal pigmentation or lesions.  NEURO: Cranial nerves grossly intact.  Mentation and speech appropriate for age.  PSYCH: Mentation appears normal, affect normal/bright, judgement and insight intact, normal speech and appearance well-groomed.    Type of service:  Video Visit  Video Start Time: 12:38PM  Video End Time: 12:44PM  Originating Location (pt. Location): Home  Distant Location (provider location):  [facility]  Platform used for Video Visit: M[employer]    No results found for any visits on 02/20/25.

## 2025-08-30 ENCOUNTER — HEALTH MAINTENANCE LETTER (OUTPATIENT)
Age: 50
End: 2025-08-30